# Patient Record
Sex: FEMALE | Race: WHITE | NOT HISPANIC OR LATINO | Employment: OTHER | URBAN - METROPOLITAN AREA
[De-identification: names, ages, dates, MRNs, and addresses within clinical notes are randomized per-mention and may not be internally consistent; named-entity substitution may affect disease eponyms.]

---

## 2017-05-16 ENCOUNTER — RECORDS - HEALTHEAST (OUTPATIENT)
Dept: LAB | Facility: CLINIC | Age: 72
End: 2017-05-16

## 2017-05-16 LAB
CHOLEST SERPL-MCNC: 155 MG/DL
FASTING STATUS PATIENT QL REPORTED: YES
HDLC SERPL-MCNC: 42 MG/DL
LDLC SERPL CALC-MCNC: 77 MG/DL
TRIGL SERPL-MCNC: 178 MG/DL

## 2017-11-21 ENCOUNTER — RECORDS - HEALTHEAST (OUTPATIENT)
Dept: LAB | Facility: CLINIC | Age: 72
End: 2017-11-21

## 2017-11-21 LAB
CHOLEST SERPL-MCNC: 153 MG/DL
FASTING STATUS PATIENT QL REPORTED: ABNORMAL
HDLC SERPL-MCNC: 40 MG/DL
LDLC SERPL CALC-MCNC: 82 MG/DL
TRIGL SERPL-MCNC: 156 MG/DL

## 2018-05-24 ENCOUNTER — RECORDS - HEALTHEAST (OUTPATIENT)
Dept: LAB | Facility: CLINIC | Age: 73
End: 2018-05-24

## 2018-05-24 LAB
ALBUMIN SERPL-MCNC: 4.3 G/DL (ref 3.5–5)
ALP SERPL-CCNC: 54 U/L (ref 45–120)
ALT SERPL W P-5'-P-CCNC: 16 U/L (ref 0–45)
ANION GAP SERPL CALCULATED.3IONS-SCNC: 14 MMOL/L (ref 5–18)
AST SERPL W P-5'-P-CCNC: 26 U/L (ref 0–40)
BASOPHILS # BLD AUTO: 0.1 THOU/UL (ref 0–0.2)
BASOPHILS NFR BLD AUTO: 1 % (ref 0–2)
BILIRUB SERPL-MCNC: 0.8 MG/DL (ref 0–1)
BUN SERPL-MCNC: 14 MG/DL (ref 8–28)
CALCIUM SERPL-MCNC: 9.8 MG/DL (ref 8.5–10.5)
CHLORIDE BLD-SCNC: 109 MMOL/L (ref 98–107)
CHOLEST SERPL-MCNC: 155 MG/DL
CO2 SERPL-SCNC: 20 MMOL/L (ref 22–31)
CREAT SERPL-MCNC: 0.94 MG/DL (ref 0.6–1.1)
EOSINOPHIL # BLD AUTO: 0.3 THOU/UL (ref 0–0.4)
EOSINOPHIL NFR BLD AUTO: 3 % (ref 0–6)
ERYTHROCYTE [DISTWIDTH] IN BLOOD BY AUTOMATED COUNT: 13.2 % (ref 11–14.5)
FASTING STATUS PATIENT QL REPORTED: ABNORMAL
GFR SERPL CREATININE-BSD FRML MDRD: 59 ML/MIN/1.73M2
GLUCOSE BLD-MCNC: 91 MG/DL (ref 70–125)
HCT VFR BLD AUTO: 43.7 % (ref 35–47)
HDLC SERPL-MCNC: 41 MG/DL
HGB BLD-MCNC: 14.2 G/DL (ref 12–16)
LDLC SERPL CALC-MCNC: 79 MG/DL
LYMPHOCYTES # BLD AUTO: 2.8 THOU/UL (ref 0.8–4.4)
LYMPHOCYTES NFR BLD AUTO: 31 % (ref 20–40)
MAGNESIUM SERPL-MCNC: 2.3 MG/DL (ref 1.8–2.6)
MCH RBC QN AUTO: 29 PG (ref 27–34)
MCHC RBC AUTO-ENTMCNC: 32.5 G/DL (ref 32–36)
MCV RBC AUTO: 89 FL (ref 80–100)
MONOCYTES # BLD AUTO: 0.7 THOU/UL (ref 0–0.9)
MONOCYTES NFR BLD AUTO: 8 % (ref 2–10)
NEUTROPHILS # BLD AUTO: 5 THOU/UL (ref 2–7.7)
NEUTROPHILS NFR BLD AUTO: 57 % (ref 50–70)
PLATELET # BLD AUTO: 311 THOU/UL (ref 140–440)
PMV BLD AUTO: 11.5 FL (ref 8.5–12.5)
POTASSIUM BLD-SCNC: 4.7 MMOL/L (ref 3.5–5)
PROT SERPL-MCNC: 7.4 G/DL (ref 6–8)
RBC # BLD AUTO: 4.89 MILL/UL (ref 3.8–5.4)
SODIUM SERPL-SCNC: 143 MMOL/L (ref 136–145)
TRIGL SERPL-MCNC: 174 MG/DL
TSH SERPL DL<=0.005 MIU/L-ACNC: 2.78 UIU/ML (ref 0.3–5)
VIT B12 SERPL-MCNC: 280 PG/ML (ref 213–816)
WBC: 8.9 THOU/UL (ref 4–11)

## 2018-05-25 LAB — 25(OH)D3 SERPL-MCNC: 90.4 NG/ML (ref 30–80)

## 2018-11-06 ENCOUNTER — RECORDS - HEALTHEAST (OUTPATIENT)
Dept: LAB | Facility: CLINIC | Age: 73
End: 2018-11-06

## 2018-11-06 LAB
ALBUMIN SERPL-MCNC: 4.2 G/DL (ref 3.5–5)
ALP SERPL-CCNC: 51 U/L (ref 45–120)
ALT SERPL W P-5'-P-CCNC: 12 U/L (ref 0–45)
ANION GAP SERPL CALCULATED.3IONS-SCNC: 11 MMOL/L (ref 5–18)
AST SERPL W P-5'-P-CCNC: 23 U/L (ref 0–40)
BASOPHILS # BLD AUTO: 0.1 THOU/UL (ref 0–0.2)
BASOPHILS NFR BLD AUTO: 1 % (ref 0–2)
BILIRUB SERPL-MCNC: 0.7 MG/DL (ref 0–1)
BUN SERPL-MCNC: 14 MG/DL (ref 8–28)
CALCIUM SERPL-MCNC: 10.1 MG/DL (ref 8.5–10.5)
CHLORIDE BLD-SCNC: 107 MMOL/L (ref 98–107)
CHOLEST SERPL-MCNC: 154 MG/DL
CO2 SERPL-SCNC: 26 MMOL/L (ref 22–31)
CREAT SERPL-MCNC: 1.02 MG/DL (ref 0.6–1.1)
EOSINOPHIL # BLD AUTO: 0.2 THOU/UL (ref 0–0.4)
EOSINOPHIL NFR BLD AUTO: 3 % (ref 0–6)
ERYTHROCYTE [DISTWIDTH] IN BLOOD BY AUTOMATED COUNT: 13 % (ref 11–14.5)
FASTING STATUS PATIENT QL REPORTED: ABNORMAL
GFR SERPL CREATININE-BSD FRML MDRD: 53 ML/MIN/1.73M2
GLUCOSE BLD-MCNC: 93 MG/DL (ref 70–125)
HCT VFR BLD AUTO: 44.7 % (ref 35–47)
HDLC SERPL-MCNC: 47 MG/DL
HGB BLD-MCNC: 14.4 G/DL (ref 12–16)
LDLC SERPL CALC-MCNC: 78 MG/DL
LYMPHOCYTES # BLD AUTO: 2.5 THOU/UL (ref 0.8–4.4)
LYMPHOCYTES NFR BLD AUTO: 29 % (ref 20–40)
MCH RBC QN AUTO: 29.2 PG (ref 27–34)
MCHC RBC AUTO-ENTMCNC: 32.2 G/DL (ref 32–36)
MCV RBC AUTO: 91 FL (ref 80–100)
MONOCYTES # BLD AUTO: 0.7 THOU/UL (ref 0–0.9)
MONOCYTES NFR BLD AUTO: 8 % (ref 2–10)
NEUTROPHILS # BLD AUTO: 5.2 THOU/UL (ref 2–7.7)
NEUTROPHILS NFR BLD AUTO: 60 % (ref 50–70)
PLATELET # BLD AUTO: 287 THOU/UL (ref 140–440)
PMV BLD AUTO: 12.3 FL (ref 8.5–12.5)
POTASSIUM BLD-SCNC: 4.9 MMOL/L (ref 3.5–5)
PROT SERPL-MCNC: 7.4 G/DL (ref 6–8)
RBC # BLD AUTO: 4.93 MILL/UL (ref 3.8–5.4)
SODIUM SERPL-SCNC: 144 MMOL/L (ref 136–145)
TRIGL SERPL-MCNC: 144 MG/DL
VIT B12 SERPL-MCNC: 250 PG/ML (ref 213–816)
WBC: 8.7 THOU/UL (ref 4–11)

## 2018-11-07 LAB — 25(OH)D3 SERPL-MCNC: 52.2 NG/ML (ref 30–80)

## 2019-05-30 ENCOUNTER — RECORDS - HEALTHEAST (OUTPATIENT)
Dept: LAB | Facility: CLINIC | Age: 74
End: 2019-05-30

## 2019-05-30 LAB
ALBUMIN SERPL-MCNC: 4.3 G/DL (ref 3.5–5)
ALP SERPL-CCNC: 50 U/L (ref 45–120)
ALT SERPL W P-5'-P-CCNC: 14 U/L (ref 0–45)
ANION GAP SERPL CALCULATED.3IONS-SCNC: 15 MMOL/L (ref 5–18)
AST SERPL W P-5'-P-CCNC: 22 U/L (ref 0–40)
BASOPHILS # BLD AUTO: 0.1 THOU/UL (ref 0–0.2)
BASOPHILS NFR BLD AUTO: 1 % (ref 0–2)
BILIRUB SERPL-MCNC: 0.7 MG/DL (ref 0–1)
BUN SERPL-MCNC: 17 MG/DL (ref 8–28)
CALCIUM SERPL-MCNC: 10.2 MG/DL (ref 8.5–10.5)
CHLORIDE BLD-SCNC: 104 MMOL/L (ref 98–107)
CHOLEST SERPL-MCNC: 173 MG/DL
CO2 SERPL-SCNC: 23 MMOL/L (ref 22–31)
CREAT SERPL-MCNC: 1 MG/DL (ref 0.6–1.1)
EOSINOPHIL # BLD AUTO: 0.2 THOU/UL (ref 0–0.4)
EOSINOPHIL NFR BLD AUTO: 2 % (ref 0–6)
ERYTHROCYTE [DISTWIDTH] IN BLOOD BY AUTOMATED COUNT: 14.1 % (ref 11–14.5)
FASTING STATUS PATIENT QL REPORTED: ABNORMAL
GFR SERPL CREATININE-BSD FRML MDRD: 55 ML/MIN/1.73M2
GLUCOSE BLD-MCNC: 81 MG/DL (ref 70–125)
HCT VFR BLD AUTO: 43.9 % (ref 35–47)
HDLC SERPL-MCNC: 47 MG/DL
HGB BLD-MCNC: 14.1 G/DL (ref 12–16)
LDLC SERPL CALC-MCNC: 92 MG/DL
LYMPHOCYTES # BLD AUTO: 2.6 THOU/UL (ref 0.8–4.4)
LYMPHOCYTES NFR BLD AUTO: 28 % (ref 20–40)
MCH RBC QN AUTO: 28.4 PG (ref 27–34)
MCHC RBC AUTO-ENTMCNC: 32.1 G/DL (ref 32–36)
MCV RBC AUTO: 89 FL (ref 80–100)
MONOCYTES # BLD AUTO: 0.8 THOU/UL (ref 0–0.9)
MONOCYTES NFR BLD AUTO: 9 % (ref 2–10)
NEUTROPHILS # BLD AUTO: 5.5 THOU/UL (ref 2–7.7)
NEUTROPHILS NFR BLD AUTO: 60 % (ref 50–70)
PLATELET # BLD AUTO: 297 THOU/UL (ref 140–440)
PMV BLD AUTO: 11.9 FL (ref 8.5–12.5)
POTASSIUM BLD-SCNC: 4.7 MMOL/L (ref 3.5–5)
PROT SERPL-MCNC: 7.4 G/DL (ref 6–8)
RBC # BLD AUTO: 4.96 MILL/UL (ref 3.8–5.4)
SODIUM SERPL-SCNC: 142 MMOL/L (ref 136–145)
TRIGL SERPL-MCNC: 170 MG/DL
TSH SERPL DL<=0.005 MIU/L-ACNC: 2.13 UIU/ML (ref 0.3–5)
VIT B12 SERPL-MCNC: 249 PG/ML (ref 213–816)
WBC: 9.3 THOU/UL (ref 4–11)

## 2019-05-31 LAB — 25(OH)D3 SERPL-MCNC: 43.5 NG/ML (ref 30–80)

## 2019-11-25 ENCOUNTER — RECORDS - HEALTHEAST (OUTPATIENT)
Dept: LAB | Facility: CLINIC | Age: 74
End: 2019-11-25

## 2019-11-25 LAB
ALBUMIN SERPL-MCNC: 4.2 G/DL (ref 3.5–5)
ALP SERPL-CCNC: 47 U/L (ref 45–120)
ALT SERPL W P-5'-P-CCNC: 12 U/L (ref 0–45)
ANION GAP SERPL CALCULATED.3IONS-SCNC: 9 MMOL/L (ref 5–18)
AST SERPL W P-5'-P-CCNC: 20 U/L (ref 0–40)
BILIRUB SERPL-MCNC: 0.8 MG/DL (ref 0–1)
BUN SERPL-MCNC: 15 MG/DL (ref 8–28)
CALCIUM SERPL-MCNC: 9.6 MG/DL (ref 8.5–10.5)
CHLORIDE BLD-SCNC: 108 MMOL/L (ref 98–107)
CHOLEST SERPL-MCNC: 161 MG/DL
CO2 SERPL-SCNC: 27 MMOL/L (ref 22–31)
CREAT SERPL-MCNC: 1.04 MG/DL (ref 0.6–1.1)
FASTING STATUS PATIENT QL REPORTED: ABNORMAL
GFR SERPL CREATININE-BSD FRML MDRD: 52 ML/MIN/1.73M2
GLUCOSE BLD-MCNC: 89 MG/DL (ref 70–125)
HDLC SERPL-MCNC: 47 MG/DL
LDLC SERPL CALC-MCNC: 87 MG/DL
MAGNESIUM SERPL-MCNC: 2 MG/DL (ref 1.8–2.6)
POTASSIUM BLD-SCNC: 4.7 MMOL/L (ref 3.5–5)
PROT SERPL-MCNC: 7.1 G/DL (ref 6–8)
SODIUM SERPL-SCNC: 144 MMOL/L (ref 136–145)
TRIGL SERPL-MCNC: 136 MG/DL
TSH SERPL DL<=0.005 MIU/L-ACNC: 2.15 UIU/ML (ref 0.3–5)
VIT B12 SERPL-MCNC: 240 PG/ML (ref 213–816)

## 2019-11-26 LAB — 25(OH)D3 SERPL-MCNC: 46.2 NG/ML (ref 30–80)

## 2020-05-04 ENCOUNTER — RECORDS - HEALTHEAST (OUTPATIENT)
Dept: LAB | Facility: CLINIC | Age: 75
End: 2020-05-04

## 2020-05-04 LAB
ALBUMIN SERPL-MCNC: 4.2 G/DL (ref 3.5–5)
ALP SERPL-CCNC: 50 U/L (ref 45–120)
ALT SERPL W P-5'-P-CCNC: 15 U/L (ref 0–45)
ANION GAP SERPL CALCULATED.3IONS-SCNC: 11 MMOL/L (ref 5–18)
AST SERPL W P-5'-P-CCNC: 24 U/L (ref 0–40)
BILIRUB SERPL-MCNC: 0.6 MG/DL (ref 0–1)
BUN SERPL-MCNC: 24 MG/DL (ref 8–28)
CALCIUM SERPL-MCNC: 9.6 MG/DL (ref 8.5–10.5)
CHLORIDE BLD-SCNC: 105 MMOL/L (ref 98–107)
CHOLEST SERPL-MCNC: 157 MG/DL
CO2 SERPL-SCNC: 27 MMOL/L (ref 22–31)
CREAT SERPL-MCNC: 1.23 MG/DL (ref 0.6–1.1)
FASTING STATUS PATIENT QL REPORTED: ABNORMAL
GFR SERPL CREATININE-BSD FRML MDRD: 43 ML/MIN/1.73M2
GLUCOSE BLD-MCNC: 92 MG/DL (ref 70–125)
HDLC SERPL-MCNC: 49 MG/DL
LDLC SERPL CALC-MCNC: 80 MG/DL
POTASSIUM BLD-SCNC: 5 MMOL/L (ref 3.5–5)
PROT SERPL-MCNC: 7.3 G/DL (ref 6–8)
SODIUM SERPL-SCNC: 143 MMOL/L (ref 136–145)
TRIGL SERPL-MCNC: 139 MG/DL
VIT B12 SERPL-MCNC: 306 PG/ML (ref 213–816)

## 2020-05-05 LAB — 25(OH)D3 SERPL-MCNC: 45.9 NG/ML (ref 30–80)

## 2020-11-10 ENCOUNTER — RECORDS - HEALTHEAST (OUTPATIENT)
Dept: LAB | Facility: CLINIC | Age: 75
End: 2020-11-10

## 2020-11-10 LAB
ALBUMIN SERPL-MCNC: 4.4 G/DL (ref 3.5–5)
ALP SERPL-CCNC: 50 U/L (ref 45–120)
ALT SERPL W P-5'-P-CCNC: 13 U/L (ref 0–45)
ANION GAP SERPL CALCULATED.3IONS-SCNC: 11 MMOL/L (ref 5–18)
AST SERPL W P-5'-P-CCNC: 23 U/L (ref 0–40)
BILIRUB SERPL-MCNC: 0.8 MG/DL (ref 0–1)
BUN SERPL-MCNC: 15 MG/DL (ref 8–28)
CALCIUM SERPL-MCNC: 9.7 MG/DL (ref 8.5–10.5)
CHLORIDE BLD-SCNC: 107 MMOL/L (ref 98–107)
CHOLEST SERPL-MCNC: 158 MG/DL
CO2 SERPL-SCNC: 25 MMOL/L (ref 22–31)
CREAT SERPL-MCNC: 1.03 MG/DL (ref 0.6–1.1)
FASTING STATUS PATIENT QL REPORTED: YES
GFR SERPL CREATININE-BSD FRML MDRD: 52 ML/MIN/1.73M2
GLUCOSE BLD-MCNC: 87 MG/DL (ref 70–125)
HDLC SERPL-MCNC: 56 MG/DL
LDLC SERPL CALC-MCNC: 79 MG/DL
MAGNESIUM SERPL-MCNC: 2.1 MG/DL (ref 1.8–2.6)
POTASSIUM BLD-SCNC: 4.7 MMOL/L (ref 3.5–5)
PROT SERPL-MCNC: 7.3 G/DL (ref 6–8)
SODIUM SERPL-SCNC: 143 MMOL/L (ref 136–145)
TRIGL SERPL-MCNC: 113 MG/DL
TSH SERPL DL<=0.005 MIU/L-ACNC: 2.53 UIU/ML (ref 0.3–5)
VIT B12 SERPL-MCNC: 290 PG/ML (ref 213–816)

## 2020-11-11 LAB — 25(OH)D3 SERPL-MCNC: 48.1 NG/ML (ref 30–80)

## 2021-05-10 ENCOUNTER — RECORDS - HEALTHEAST (OUTPATIENT)
Dept: LAB | Facility: CLINIC | Age: 76
End: 2021-05-10

## 2021-05-10 LAB
ALBUMIN SERPL-MCNC: 3.9 G/DL (ref 3.5–5)
ALP SERPL-CCNC: 47 U/L (ref 45–120)
ALT SERPL W P-5'-P-CCNC: 13 U/L (ref 0–45)
ANION GAP SERPL CALCULATED.3IONS-SCNC: 12 MMOL/L (ref 5–18)
AST SERPL W P-5'-P-CCNC: 22 U/L (ref 0–40)
BILIRUB SERPL-MCNC: 0.7 MG/DL (ref 0–1)
BUN SERPL-MCNC: 18 MG/DL (ref 8–28)
CALCIUM SERPL-MCNC: 8.9 MG/DL (ref 8.5–10.5)
CHLORIDE BLD-SCNC: 106 MMOL/L (ref 98–107)
CHOLEST SERPL-MCNC: 141 MG/DL
CO2 SERPL-SCNC: 25 MMOL/L (ref 22–31)
CREAT SERPL-MCNC: 1 MG/DL (ref 0.6–1.1)
FASTING STATUS PATIENT QL REPORTED: ABNORMAL
GFR SERPL CREATININE-BSD FRML MDRD: 54 ML/MIN/1.73M2
GLUCOSE BLD-MCNC: 87 MG/DL (ref 70–125)
HDLC SERPL-MCNC: 46 MG/DL
LDLC SERPL CALC-MCNC: 70 MG/DL
POTASSIUM BLD-SCNC: 4.3 MMOL/L (ref 3.5–5)
PROT SERPL-MCNC: 6.6 G/DL (ref 6–8)
SODIUM SERPL-SCNC: 143 MMOL/L (ref 136–145)
TRIGL SERPL-MCNC: 125 MG/DL
TSH SERPL DL<=0.005 MIU/L-ACNC: 3.4 UIU/ML (ref 0.3–5)
VIT B12 SERPL-MCNC: 271 PG/ML (ref 213–816)

## 2021-05-11 LAB — 25(OH)D3 SERPL-MCNC: 40.5 NG/ML (ref 30–80)

## 2021-05-25 ENCOUNTER — RECORDS - HEALTHEAST (OUTPATIENT)
Dept: ADMINISTRATIVE | Facility: CLINIC | Age: 76
End: 2021-05-25

## 2021-05-27 ENCOUNTER — RECORDS - HEALTHEAST (OUTPATIENT)
Dept: ADMINISTRATIVE | Facility: CLINIC | Age: 76
End: 2021-05-27

## 2021-05-28 ENCOUNTER — RECORDS - HEALTHEAST (OUTPATIENT)
Dept: ADMINISTRATIVE | Facility: CLINIC | Age: 76
End: 2021-05-28

## 2021-05-29 ENCOUNTER — RECORDS - HEALTHEAST (OUTPATIENT)
Dept: ADMINISTRATIVE | Facility: CLINIC | Age: 76
End: 2021-05-29

## 2021-05-30 ENCOUNTER — RECORDS - HEALTHEAST (OUTPATIENT)
Dept: ADMINISTRATIVE | Facility: CLINIC | Age: 76
End: 2021-05-30

## 2021-07-13 ENCOUNTER — RECORDS - HEALTHEAST (OUTPATIENT)
Dept: ADMINISTRATIVE | Facility: CLINIC | Age: 76
End: 2021-07-13

## 2021-07-21 ENCOUNTER — RECORDS - HEALTHEAST (OUTPATIENT)
Dept: ADMINISTRATIVE | Facility: CLINIC | Age: 76
End: 2021-07-21

## 2021-11-16 ENCOUNTER — LAB REQUISITION (OUTPATIENT)
Dept: LAB | Facility: CLINIC | Age: 76
End: 2021-11-16

## 2021-11-16 DIAGNOSIS — E53.8 DEFICIENCY OF OTHER SPECIFIED B GROUP VITAMINS: ICD-10-CM

## 2021-11-16 DIAGNOSIS — E78.2 MIXED HYPERLIPIDEMIA: ICD-10-CM

## 2021-11-16 DIAGNOSIS — E55.9 VITAMIN D DEFICIENCY, UNSPECIFIED: ICD-10-CM

## 2021-11-16 LAB
ALBUMIN SERPL-MCNC: 4 G/DL (ref 3.5–5)
ALP SERPL-CCNC: 51 U/L (ref 45–120)
ALT SERPL W P-5'-P-CCNC: 14 U/L (ref 0–45)
ANION GAP SERPL CALCULATED.3IONS-SCNC: 11 MMOL/L (ref 5–18)
AST SERPL W P-5'-P-CCNC: 24 U/L (ref 0–40)
BILIRUB SERPL-MCNC: 0.8 MG/DL (ref 0–1)
BUN SERPL-MCNC: 17 MG/DL (ref 8–28)
CALCIUM SERPL-MCNC: 9.8 MG/DL (ref 8.5–10.5)
CHLORIDE BLD-SCNC: 107 MMOL/L (ref 98–107)
CHOLEST SERPL-MCNC: 146 MG/DL
CO2 SERPL-SCNC: 25 MMOL/L (ref 22–31)
CREAT SERPL-MCNC: 1.06 MG/DL (ref 0.6–1.1)
GFR SERPL CREATININE-BSD FRML MDRD: 51 ML/MIN/1.73M2
GLUCOSE BLD-MCNC: 99 MG/DL (ref 70–125)
HDLC SERPL-MCNC: 48 MG/DL
LDLC SERPL CALC-MCNC: 73 MG/DL
POTASSIUM BLD-SCNC: 4.8 MMOL/L (ref 3.5–5)
PROT SERPL-MCNC: 6.9 G/DL (ref 6–8)
SODIUM SERPL-SCNC: 143 MMOL/L (ref 136–145)
TRIGL SERPL-MCNC: 126 MG/DL
TSH SERPL DL<=0.005 MIU/L-ACNC: 3.07 UIU/ML (ref 0.3–5)
VIT B12 SERPL-MCNC: 270 PG/ML (ref 213–816)

## 2021-11-16 PROCEDURE — 80061 LIPID PANEL: CPT | Performed by: FAMILY MEDICINE

## 2021-11-16 PROCEDURE — 84443 ASSAY THYROID STIM HORMONE: CPT | Performed by: FAMILY MEDICINE

## 2021-11-16 PROCEDURE — 80053 COMPREHEN METABOLIC PANEL: CPT | Performed by: FAMILY MEDICINE

## 2021-11-16 PROCEDURE — 82607 VITAMIN B-12: CPT | Performed by: FAMILY MEDICINE

## 2021-11-16 PROCEDURE — 82306 VITAMIN D 25 HYDROXY: CPT | Performed by: FAMILY MEDICINE

## 2021-11-17 LAB — DEPRECATED CALCIDIOL+CALCIFEROL SERPL-MC: 49 UG/L (ref 30–80)

## 2022-05-16 ENCOUNTER — LAB REQUISITION (OUTPATIENT)
Dept: LAB | Facility: CLINIC | Age: 77
End: 2022-05-16

## 2022-05-16 DIAGNOSIS — E78.2 MIXED HYPERLIPIDEMIA: ICD-10-CM

## 2022-05-16 DIAGNOSIS — I10 ESSENTIAL (PRIMARY) HYPERTENSION: ICD-10-CM

## 2022-05-16 DIAGNOSIS — E55.9 VITAMIN D DEFICIENCY, UNSPECIFIED: ICD-10-CM

## 2022-05-16 DIAGNOSIS — N18.32 CHRONIC KIDNEY DISEASE, STAGE 3B (H): ICD-10-CM

## 2022-05-16 LAB
ALBUMIN SERPL-MCNC: 3.9 G/DL (ref 3.5–5)
ALP SERPL-CCNC: 47 U/L (ref 45–120)
ALT SERPL W P-5'-P-CCNC: 15 U/L (ref 0–45)
ANION GAP SERPL CALCULATED.3IONS-SCNC: 10 MMOL/L (ref 5–18)
AST SERPL W P-5'-P-CCNC: 33 U/L (ref 0–40)
BILIRUB SERPL-MCNC: 0.7 MG/DL (ref 0–1)
BUN SERPL-MCNC: 20 MG/DL (ref 8–28)
CALCIUM SERPL-MCNC: 9.4 MG/DL (ref 8.5–10.5)
CHLORIDE BLD-SCNC: 108 MMOL/L (ref 98–107)
CHOLEST SERPL-MCNC: 142 MG/DL
CO2 SERPL-SCNC: 26 MMOL/L (ref 22–31)
CREAT SERPL-MCNC: 1.09 MG/DL (ref 0.6–1.1)
GFR SERPL CREATININE-BSD FRML MDRD: 53 ML/MIN/1.73M2
GLUCOSE BLD-MCNC: 92 MG/DL (ref 70–125)
HDLC SERPL-MCNC: 51 MG/DL
LDLC SERPL CALC-MCNC: 66 MG/DL
POTASSIUM BLD-SCNC: 4.5 MMOL/L (ref 3.5–5)
PROT SERPL-MCNC: 6.8 G/DL (ref 6–8)
SODIUM SERPL-SCNC: 144 MMOL/L (ref 136–145)
TRIGL SERPL-MCNC: 127 MG/DL
VIT B12 SERPL-MCNC: 486 PG/ML (ref 213–816)

## 2022-05-16 PROCEDURE — 82306 VITAMIN D 25 HYDROXY: CPT | Performed by: STUDENT IN AN ORGANIZED HEALTH CARE EDUCATION/TRAINING PROGRAM

## 2022-05-16 PROCEDURE — 80061 LIPID PANEL: CPT | Performed by: STUDENT IN AN ORGANIZED HEALTH CARE EDUCATION/TRAINING PROGRAM

## 2022-05-16 PROCEDURE — 82607 VITAMIN B-12: CPT | Performed by: STUDENT IN AN ORGANIZED HEALTH CARE EDUCATION/TRAINING PROGRAM

## 2022-05-16 PROCEDURE — 80053 COMPREHEN METABOLIC PANEL: CPT | Performed by: STUDENT IN AN ORGANIZED HEALTH CARE EDUCATION/TRAINING PROGRAM

## 2022-05-17 LAB — DEPRECATED CALCIDIOL+CALCIFEROL SERPL-MC: 65 UG/L (ref 20–75)

## 2022-11-16 ENCOUNTER — LAB REQUISITION (OUTPATIENT)
Dept: LAB | Facility: CLINIC | Age: 77
End: 2022-11-16

## 2022-11-16 DIAGNOSIS — I12.9 HYPERTENSIVE CHRONIC KIDNEY DISEASE WITH STAGE 1 THROUGH STAGE 4 CHRONIC KIDNEY DISEASE, OR UNSPECIFIED CHRONIC KIDNEY DISEASE: ICD-10-CM

## 2022-11-16 DIAGNOSIS — E55.9 VITAMIN D DEFICIENCY, UNSPECIFIED: ICD-10-CM

## 2022-11-16 DIAGNOSIS — E78.2 MIXED HYPERLIPIDEMIA: ICD-10-CM

## 2022-11-16 DIAGNOSIS — I10 ESSENTIAL (PRIMARY) HYPERTENSION: ICD-10-CM

## 2022-11-16 LAB
ERYTHROCYTE [DISTWIDTH] IN BLOOD BY AUTOMATED COUNT: 13.5 % (ref 10–15)
HCT VFR BLD AUTO: 41.2 % (ref 35–47)
HGB BLD-MCNC: 13.2 G/DL (ref 11.7–15.7)
MCH RBC QN AUTO: 28.6 PG (ref 26.5–33)
MCHC RBC AUTO-ENTMCNC: 32 G/DL (ref 31.5–36.5)
MCV RBC AUTO: 89 FL (ref 78–100)
PLATELET # BLD AUTO: 288 10E3/UL (ref 150–450)
RBC # BLD AUTO: 4.61 10E6/UL (ref 3.8–5.2)
WBC # BLD AUTO: 8.9 10E3/UL (ref 4–11)

## 2022-11-16 PROCEDURE — 82607 VITAMIN B-12: CPT | Performed by: STUDENT IN AN ORGANIZED HEALTH CARE EDUCATION/TRAINING PROGRAM

## 2022-11-16 PROCEDURE — 80061 LIPID PANEL: CPT | Performed by: STUDENT IN AN ORGANIZED HEALTH CARE EDUCATION/TRAINING PROGRAM

## 2022-11-16 PROCEDURE — 80053 COMPREHEN METABOLIC PANEL: CPT | Performed by: STUDENT IN AN ORGANIZED HEALTH CARE EDUCATION/TRAINING PROGRAM

## 2022-11-16 PROCEDURE — 85027 COMPLETE CBC AUTOMATED: CPT | Performed by: STUDENT IN AN ORGANIZED HEALTH CARE EDUCATION/TRAINING PROGRAM

## 2022-11-16 PROCEDURE — 82306 VITAMIN D 25 HYDROXY: CPT | Performed by: STUDENT IN AN ORGANIZED HEALTH CARE EDUCATION/TRAINING PROGRAM

## 2022-11-17 LAB
ALBUMIN SERPL BCG-MCNC: 4.3 G/DL (ref 3.5–5.2)
ALP SERPL-CCNC: 52 U/L (ref 35–104)
ALT SERPL W P-5'-P-CCNC: 12 U/L (ref 10–35)
ANION GAP SERPL CALCULATED.3IONS-SCNC: 13 MMOL/L (ref 7–15)
AST SERPL W P-5'-P-CCNC: 25 U/L (ref 10–35)
BILIRUB SERPL-MCNC: 0.7 MG/DL
BUN SERPL-MCNC: 17.2 MG/DL (ref 8–23)
CALCIUM SERPL-MCNC: 9.4 MG/DL (ref 8.8–10.2)
CHLORIDE SERPL-SCNC: 105 MMOL/L (ref 98–107)
CHOLEST SERPL-MCNC: 143 MG/DL
CREAT SERPL-MCNC: 1.11 MG/DL (ref 0.51–0.95)
DEPRECATED CALCIDIOL+CALCIFEROL SERPL-MC: 75 UG/L (ref 20–75)
DEPRECATED HCO3 PLAS-SCNC: 23 MMOL/L (ref 22–29)
GFR SERPL CREATININE-BSD FRML MDRD: 51 ML/MIN/1.73M2
GLUCOSE SERPL-MCNC: 94 MG/DL (ref 70–99)
HDLC SERPL-MCNC: 52 MG/DL
LDLC SERPL CALC-MCNC: 73 MG/DL
NONHDLC SERPL-MCNC: 91 MG/DL
POTASSIUM SERPL-SCNC: 4.5 MMOL/L (ref 3.4–5.3)
PROT SERPL-MCNC: 6.7 G/DL (ref 6.4–8.3)
SODIUM SERPL-SCNC: 141 MMOL/L (ref 136–145)
TRIGL SERPL-MCNC: 91 MG/DL
VIT B12 SERPL-MCNC: 721 PG/ML (ref 232–1245)

## 2023-05-24 ENCOUNTER — LAB REQUISITION (OUTPATIENT)
Dept: LAB | Facility: CLINIC | Age: 78
End: 2023-05-24

## 2023-05-24 DIAGNOSIS — I12.9 HYPERTENSIVE CHRONIC KIDNEY DISEASE WITH STAGE 1 THROUGH STAGE 4 CHRONIC KIDNEY DISEASE, OR UNSPECIFIED CHRONIC KIDNEY DISEASE: ICD-10-CM

## 2023-05-24 DIAGNOSIS — N18.31 CHRONIC KIDNEY DISEASE, STAGE 3A (H): ICD-10-CM

## 2023-05-24 DIAGNOSIS — E53.8 DEFICIENCY OF OTHER SPECIFIED B GROUP VITAMINS: ICD-10-CM

## 2023-05-24 DIAGNOSIS — E78.2 MIXED HYPERLIPIDEMIA: ICD-10-CM

## 2023-05-24 LAB
ALBUMIN SERPL BCG-MCNC: 4.3 G/DL (ref 3.5–5.2)
ALP SERPL-CCNC: 45 U/L (ref 35–104)
ALT SERPL W P-5'-P-CCNC: 14 U/L (ref 10–35)
ANION GAP SERPL CALCULATED.3IONS-SCNC: 12 MMOL/L (ref 7–15)
AST SERPL W P-5'-P-CCNC: 26 U/L (ref 10–35)
BILIRUB SERPL-MCNC: 0.7 MG/DL
BUN SERPL-MCNC: 19.5 MG/DL (ref 8–23)
CALCIUM SERPL-MCNC: 9.4 MG/DL (ref 8.8–10.2)
CHLORIDE SERPL-SCNC: 106 MMOL/L (ref 98–107)
CHOLEST SERPL-MCNC: 149 MG/DL
CREAT SERPL-MCNC: 1.23 MG/DL (ref 0.51–0.95)
DEPRECATED CALCIDIOL+CALCIFEROL SERPL-MC: 77 UG/L (ref 20–75)
DEPRECATED HCO3 PLAS-SCNC: 24 MMOL/L (ref 22–29)
GFR SERPL CREATININE-BSD FRML MDRD: 45 ML/MIN/1.73M2
GLUCOSE SERPL-MCNC: 96 MG/DL (ref 70–99)
HDLC SERPL-MCNC: 56 MG/DL
LDLC SERPL CALC-MCNC: 72 MG/DL
NONHDLC SERPL-MCNC: 93 MG/DL
POTASSIUM SERPL-SCNC: 5.1 MMOL/L (ref 3.4–5.3)
PROT SERPL-MCNC: 6.3 G/DL (ref 6.4–8.3)
SODIUM SERPL-SCNC: 142 MMOL/L (ref 136–145)
TRIGL SERPL-MCNC: 106 MG/DL
VIT B12 SERPL-MCNC: 582 PG/ML (ref 232–1245)

## 2023-05-24 PROCEDURE — 82306 VITAMIN D 25 HYDROXY: CPT | Performed by: STUDENT IN AN ORGANIZED HEALTH CARE EDUCATION/TRAINING PROGRAM

## 2023-05-24 PROCEDURE — 80061 LIPID PANEL: CPT | Performed by: STUDENT IN AN ORGANIZED HEALTH CARE EDUCATION/TRAINING PROGRAM

## 2023-05-24 PROCEDURE — 80053 COMPREHEN METABOLIC PANEL: CPT | Performed by: STUDENT IN AN ORGANIZED HEALTH CARE EDUCATION/TRAINING PROGRAM

## 2023-05-24 PROCEDURE — 82607 VITAMIN B-12: CPT | Performed by: STUDENT IN AN ORGANIZED HEALTH CARE EDUCATION/TRAINING PROGRAM

## 2023-11-09 ENCOUNTER — LAB REQUISITION (OUTPATIENT)
Dept: LAB | Facility: CLINIC | Age: 78
End: 2023-11-09

## 2023-11-09 DIAGNOSIS — M79.10 MYALGIA, UNSPECIFIED SITE: ICD-10-CM

## 2023-11-09 DIAGNOSIS — E53.8 DEFICIENCY OF OTHER SPECIFIED B GROUP VITAMINS: ICD-10-CM

## 2023-11-09 PROCEDURE — 86140 C-REACTIVE PROTEIN: CPT | Performed by: STUDENT IN AN ORGANIZED HEALTH CARE EDUCATION/TRAINING PROGRAM

## 2023-11-09 PROCEDURE — 86431 RHEUMATOID FACTOR QUANT: CPT | Performed by: STUDENT IN AN ORGANIZED HEALTH CARE EDUCATION/TRAINING PROGRAM

## 2023-11-09 PROCEDURE — 80053 COMPREHEN METABOLIC PANEL: CPT | Performed by: STUDENT IN AN ORGANIZED HEALTH CARE EDUCATION/TRAINING PROGRAM

## 2023-11-09 PROCEDURE — 82607 VITAMIN B-12: CPT | Performed by: STUDENT IN AN ORGANIZED HEALTH CARE EDUCATION/TRAINING PROGRAM

## 2023-11-10 LAB
ALBUMIN SERPL BCG-MCNC: 4 G/DL (ref 3.5–5.2)
ALP SERPL-CCNC: 56 U/L (ref 35–104)
ALT SERPL W P-5'-P-CCNC: 10 U/L (ref 0–50)
ANION GAP SERPL CALCULATED.3IONS-SCNC: 12 MMOL/L (ref 7–15)
AST SERPL W P-5'-P-CCNC: 23 U/L (ref 0–45)
BILIRUB SERPL-MCNC: 0.3 MG/DL
BUN SERPL-MCNC: 18.8 MG/DL (ref 8–23)
CALCIUM SERPL-MCNC: 8.9 MG/DL (ref 8.8–10.2)
CHLORIDE SERPL-SCNC: 103 MMOL/L (ref 98–107)
CREAT SERPL-MCNC: 1.17 MG/DL (ref 0.51–0.95)
CRP SERPL-MCNC: 8.35 MG/L
DEPRECATED HCO3 PLAS-SCNC: 24 MMOL/L (ref 22–29)
EGFRCR SERPLBLD CKD-EPI 2021: 48 ML/MIN/1.73M2
GLUCOSE SERPL-MCNC: 87 MG/DL (ref 70–99)
POTASSIUM SERPL-SCNC: 4.6 MMOL/L (ref 3.4–5.3)
PROT SERPL-MCNC: 6.9 G/DL (ref 6.4–8.3)
RHEUMATOID FACT SER NEPH-ACNC: 11 IU/ML
SODIUM SERPL-SCNC: 139 MMOL/L (ref 135–145)
VIT B12 SERPL-MCNC: 583 PG/ML (ref 232–1245)

## 2023-11-17 ENCOUNTER — HOSPITAL ENCOUNTER (EMERGENCY)
Facility: CLINIC | Age: 78
Discharge: HOME | End: 2023-11-17
Attending: STUDENT IN AN ORGANIZED HEALTH CARE EDUCATION/TRAINING PROGRAM | Admitting: STUDENT IN AN ORGANIZED HEALTH CARE EDUCATION/TRAINING PROGRAM
Payer: COMMERCIAL

## 2023-11-17 VITALS
HEART RATE: 68 BPM | TEMPERATURE: 97.6 F | OXYGEN SATURATION: 96 % | BODY MASS INDEX: 25.87 KG/M2 | DIASTOLIC BLOOD PRESSURE: 81 MMHG | HEIGHT: 63 IN | RESPIRATION RATE: 18 BRPM | WEIGHT: 146 LBS | SYSTOLIC BLOOD PRESSURE: 111 MMHG

## 2023-11-17 DIAGNOSIS — M25.50 MULTIPLE JOINT PAIN: ICD-10-CM

## 2023-11-17 LAB
ALBUMIN SERPL BCG-MCNC: 3.8 G/DL (ref 3.5–5.2)
ALP SERPL-CCNC: 56 U/L (ref 40–150)
ALT SERPL W P-5'-P-CCNC: 6 U/L (ref 0–50)
ANION GAP SERPL CALCULATED.3IONS-SCNC: 13 MMOL/L (ref 7–15)
AST SERPL W P-5'-P-CCNC: 28 U/L (ref 0–45)
BASOPHILS # BLD AUTO: 0 10E3/UL (ref 0–0.2)
BASOPHILS NFR BLD AUTO: 0 %
BILIRUB SERPL-MCNC: 0.4 MG/DL
BUN SERPL-MCNC: 24.5 MG/DL (ref 8–23)
CALCIUM SERPL-MCNC: 9.6 MG/DL (ref 8.8–10.2)
CHLORIDE SERPL-SCNC: 104 MMOL/L (ref 98–107)
CK SERPL-CCNC: 57 U/L (ref 26–192)
CREAT SERPL-MCNC: 1.03 MG/DL (ref 0.51–0.95)
CRP SERPL-MCNC: 53.5 MG/L
DEPRECATED HCO3 PLAS-SCNC: 25 MMOL/L (ref 22–29)
EGFRCR SERPLBLD CKD-EPI 2021: 56 ML/MIN/1.73M2
EOSINOPHIL # BLD AUTO: 0.1 10E3/UL (ref 0–0.7)
EOSINOPHIL NFR BLD AUTO: 2 %
ERYTHROCYTE [DISTWIDTH] IN BLOOD BY AUTOMATED COUNT: 13.9 % (ref 10–15)
ERYTHROCYTE [SEDIMENTATION RATE] IN BLOOD BY WESTERGREN METHOD: 30 MM/HR (ref 0–30)
GLUCOSE SERPL-MCNC: 95 MG/DL (ref 70–99)
HCT VFR BLD AUTO: 40.9 % (ref 35–47)
HGB BLD-MCNC: 13.2 G/DL (ref 11.7–15.7)
IMM GRANULOCYTES # BLD: 0 10E3/UL
IMM GRANULOCYTES NFR BLD: 0 %
LYMPHOCYTES # BLD AUTO: 1.3 10E3/UL (ref 0.8–5.3)
LYMPHOCYTES NFR BLD AUTO: 16 %
MCH RBC QN AUTO: 28.7 PG (ref 26.5–33)
MCHC RBC AUTO-ENTMCNC: 32.3 G/DL (ref 31.5–36.5)
MCV RBC AUTO: 89 FL (ref 78–100)
MONOCYTES # BLD AUTO: 0.6 10E3/UL (ref 0–1.3)
MONOCYTES NFR BLD AUTO: 7 %
NEUTROPHILS # BLD AUTO: 6.1 10E3/UL (ref 1.6–8.3)
NEUTROPHILS NFR BLD AUTO: 75 %
NRBC # BLD AUTO: 0 10E3/UL
NRBC BLD AUTO-RTO: 0 /100
PLATELET # BLD AUTO: 282 10E3/UL (ref 150–450)
POTASSIUM SERPL-SCNC: 4.4 MMOL/L (ref 3.4–5.3)
PROT SERPL-MCNC: 7 G/DL (ref 6.4–8.3)
RBC # BLD AUTO: 4.6 10E6/UL (ref 3.8–5.2)
RHEUMATOID FACT SERPL-ACNC: <10 IU/ML
SODIUM SERPL-SCNC: 142 MMOL/L (ref 135–145)
WBC # BLD AUTO: 8.2 10E3/UL (ref 4–11)

## 2023-11-17 PROCEDURE — 80053 COMPREHEN METABOLIC PANEL: CPT | Performed by: STUDENT IN AN ORGANIZED HEALTH CARE EDUCATION/TRAINING PROGRAM

## 2023-11-17 PROCEDURE — 36415 COLL VENOUS BLD VENIPUNCTURE: CPT | Performed by: STUDENT IN AN ORGANIZED HEALTH CARE EDUCATION/TRAINING PROGRAM

## 2023-11-17 PROCEDURE — 85025 COMPLETE CBC W/AUTO DIFF WBC: CPT | Performed by: STUDENT IN AN ORGANIZED HEALTH CARE EDUCATION/TRAINING PROGRAM

## 2023-11-17 PROCEDURE — 86140 C-REACTIVE PROTEIN: CPT | Performed by: STUDENT IN AN ORGANIZED HEALTH CARE EDUCATION/TRAINING PROGRAM

## 2023-11-17 PROCEDURE — 86431 RHEUMATOID FACTOR QUANT: CPT | Performed by: STUDENT IN AN ORGANIZED HEALTH CARE EDUCATION/TRAINING PROGRAM

## 2023-11-17 PROCEDURE — 99283 EMERGENCY DEPT VISIT LOW MDM: CPT

## 2023-11-17 PROCEDURE — 85652 RBC SED RATE AUTOMATED: CPT | Performed by: STUDENT IN AN ORGANIZED HEALTH CARE EDUCATION/TRAINING PROGRAM

## 2023-11-17 PROCEDURE — 82550 ASSAY OF CK (CPK): CPT | Performed by: STUDENT IN AN ORGANIZED HEALTH CARE EDUCATION/TRAINING PROGRAM

## 2023-11-17 PROCEDURE — 250N000012 HC RX MED GY IP 250 OP 636 PS 637: Performed by: STUDENT IN AN ORGANIZED HEALTH CARE EDUCATION/TRAINING PROGRAM

## 2023-11-17 PROCEDURE — 86200 CCP ANTIBODY: CPT | Performed by: STUDENT IN AN ORGANIZED HEALTH CARE EDUCATION/TRAINING PROGRAM

## 2023-11-17 RX ORDER — PREDNISONE 20 MG/1
60 TABLET ORAL DAILY
Qty: 15 TABLET | Refills: 0 | Status: SHIPPED | OUTPATIENT
Start: 2023-11-17 | End: 2023-11-22

## 2023-11-17 RX ORDER — HYDROCODONE BITARTRATE AND ACETAMINOPHEN 5; 325 MG/1; MG/1
1 TABLET ORAL EVERY 6 HOURS PRN
Qty: 6 TABLET | Refills: 0 | Status: SHIPPED | OUTPATIENT
Start: 2023-11-17 | End: 2023-11-20

## 2023-11-17 RX ORDER — PREDNISONE 20 MG/1
60 TABLET ORAL ONCE
Status: COMPLETED | OUTPATIENT
Start: 2023-11-17 | End: 2023-11-17

## 2023-11-17 RX ADMIN — PREDNISONE 60 MG: 20 TABLET ORAL at 14:38

## 2023-11-17 NOTE — DISCHARGE INSTRUCTIONS
Your blood work today so far was reassuring.  Please take the steroids as prescribed.  Please take the hydrocodone as needed.    Norco (hydrocodone + tylenol) is a narcotic pain medication that may make you feel drousy. Please do not drive a car, operate machinery or any other activity that you should not be sedated.  It may also cause constipation, please take an over the counter stool softner with this medication.    I also placed an electronic referral for rheumatology.  They should be calling you back in the next few days/early next week.

## 2023-11-17 NOTE — ED TRIAGE NOTES
Pt presents to the ED with c/o worsening joint pain over the past month. Pt taking gabapentin and tylenol without relief. Pt states hands, fingers, shoulders, and knee have been hurting worse. Having difficult time hold items, walking, and feeding herself.

## 2023-11-17 NOTE — ED PROVIDER NOTES
Emergency Department Encounter         FINAL IMPRESSION:  Polyarthralgia          ED COURSE AND MEDICAL DECISION MAKING       ED Course as of 11/17/23 1602   Fri Nov 17, 2023   1423 Patient is a 78-year-old female here with multiple areas of joint pain for the last 16 days or so.  States symptoms began beginning the month.  States her shoulders, elbows, hands, wrists, knees, hips are sore and she is generally weak and tired.  Having difficulty standing up out of a chair.  Difficulty brushing her hair.  No systemic symptoms including fevers, chills, nausea vomiting.  No chest pain or trouble breathing.  No headaches, vision changes or paresthesias.  Normal urination normal bowel movements.  No rashes appreciated specifically on the backs of her hands or across her shoulders suggesting shawl sign.  On examination she does have minimal edema bilateral hands symmetrically.  She has normal strength otherwise.  Concern for diagnoses such as RA, PMR, I think she will need basic blood work here and an urgent referral to rheumatology.  We will start her on high-dose steroids to hopefully help with her symptomatology.   -Labs suggestive of inflammatory condition.  Patient given prednisone here.  She does have a history of cortisone allergy in her chart.  Shared decision making with patient and family regarding potential cross-reactivity with prednisone.  Sound like she had a reaction to a cortisone cream at 1 point.  Patient states that she is okay with trying prednisone here with risks of allergic reaction.  -Electronic referral given for rheumatology.      1:56 PM I met with the patient to obtain patient history and performed a physical exam. Discussed plan for ED work up including potential diagnostic studies and interventions.  3:00 PM Reevaluated and updated the patient with findings. We discussed the plan for discharge and the patient is agreeable. Reviewed supportive cares, symptomatic treatment, outpatient follow  up, and reasons to return to the Emergency Department. Patient to be discharged by ED RN.         Medical Decision Making    History:  Supplemental history from: Documented in chart, if applicable  External Record(s) reviewed: Documented in chart, if applicable.    Work Up:  Chart documentation includes differential considered and any EKGs or imaging independently interpreted by provider, where specified.  In additional to work up documented, I considered the following work up: Documented in chart, if applicable.    External consultation:  Discussion of management with another provider: Documented in chart, if applicable    Complicating factors:  Care impacted by chronic illness: Chronic Pain  Care affected by social determinants of health: Access to Medical Care    Disposition considerations: Discharge. I prescribed additional prescription strength medication(s) as charted. See documentation for any additional details.                  EKG  None      Critical Care     Performed by: Ludwig Hastings or    Authorized by: Ludwig Hastings  Total critical care time:  minutes  Critical care was necessary to treat or prevent imminent or life-threatening deterioration of the following conditions:   Critical care was time spent personally by me on the following activities: development of treatment plan with patient or surrogate, discussions with consultants, examination of patient, evaluation of patient's response to treatment, obtaining history from patient or surrogate, ordering and performing treatments and interventions, ordering and review of laboratory studies, ordering and review of radiographic studies, re-evaluation of patient's condition and monitoring for potential decompensation.  Critical care time was exclusive of separately billable procedures and treating other patients.'    At the conclusion of the encounter I discussed the results of all the tests and the disposition. The questions were answered. The patient or family  "acknowledged understanding and was agreeable with the care plan.        MEDICATIONS GIVEN IN THE EMERGENCY DEPARTMENT:  Medications   predniSONE (DELTASONE) tablet 60 mg (60 mg Oral $Given 11/17/23 4454)       NEW PRESCRIPTIONS STARTED AT TODAY'S ED VISIT:  Discharge Medication List as of 11/17/2023  3:10 PM        START taking these medications    Details   !! HYDROcodone-acetaminophen (NORCO) 5-325 MG tablet Take 1 tablet by mouth every 6 hours as needed for pain, Disp-6 tablet, R-0, Local Print      predniSONE (DELTASONE) 20 MG tablet Take 3 tablets (60 mg) by mouth daily for 5 days, Disp-15 tablet, R-0, Local Print       !! - Potential duplicate medications found. Please discuss with provider.          HPI     Patient information obtained from: patient    Use of : N/A     Chelsea CHAIREZ Madonna is a 78 year old female with a pertinent history of arthritis who presents to this ED via walk-in with family for evaluation of joint pain.    Patient reports persistent progressively worsening severe generalized joint pain since 11/1/23. She says the pain started in her hands and then progressively spread to her shoulder joints, pack, hips, and knees. She went to see a PCP on 11/9 who prescribed her gabapentin but she says this didn't do anything for her and they \"found nothing to tell her.\" Within the past 3-4 nights, she says the pain severely worsened and now she is unable to sleep, get up, or pick small things up like a fork. She's never felt this before. Due to the persistent pain, she decided to come into the ED for evaluation. She also associates some decrease in appetite secondary to the pain. She has been using tylenol and ibuprofen with minimal relief.     She otherwise denies associating fever, chills, chest pain, shortness of breath, dysuria, difficulty urinating, abdominal pain, decrease in liquid intake, cough, congestion, changes in vision, and rash. There were no other concerns/complaints at this " "time.      Shx: She lives home alone since 6/2023 after her  passed.      MEDICAL HISTORY     History reviewed. No pertinent past medical history.    History reviewed. No pertinent surgical history.         HYDROcodone-acetaminophen (NORCO) 5-325 MG tablet  predniSONE (DELTASONE) 20 MG tablet  aspirin 81 mg chewable tablet  calcium-vitamin D 500 mg(1,250mg) -200 unit per tablet  cholecalciferol, vitamin D3, 1,000 unit tablet  cyanocobalamin 1000 MCG tablet  furosemide (LASIX) 20 MG tablet  glucosamine sulfate (GLUCOSAMINE) 500 mg Tab  HYDROcodone-acetaminophen 5-325 mg per tablet  ibuprofen (ADVIL,MOTRIN) 600 MG tablet  lisinopril (PRINIVIL,ZESTRIL) 40 MG tablet  magnesium 200 mg Tab  ondansetron (ZOFRAN ODT) 4 MG disintegrating tablet  potassium chloride SA (K-DUR,KLOR-CON) 10 MEQ tablet  pravastatin (PRAVACHOL) 80 MG tablet  therapeutic multivitamin (THERAGRAN) tablet            PHYSICAL EXAM     /81   Pulse 68   Temp 97.6  F (36.4  C) (Temporal)   Resp 18   Ht 1.6 m (5' 3\")   Wt 66.2 kg (146 lb)   SpO2 96%   BMI 25.86 kg/m        PHYSICAL EXAM:     General: Patient appears well, nontoxic, comfortable  HEENT: Moist mucous membranes,  No head trauma.    Cardiovascular: Normal rate, normal rhythm. Minimal edema bilateral hands symmetrically. No appreciable murmur.  Respiratory: No signs of respiratory distress, lungs are clear to auscultation bilaterally with no wheezes rhonchi or rales.  Abdominal: Soft, nontender, nondistended, no palpable masses, no guarding, no rebound  Musculoskeletal: Full range of motion of joints, no deformities appreciated.  Bilateral hand swelling.  No redness.  Normal strength in upper and lower extremities.  Neurological: Alert and oriented, grossly neurologically intact. She has normal strength otherwise.   Psychological: Normal affect and mood.  Integument: No rashes appreciated        RESULTS       Labs Ordered and Resulted from Time of ED Arrival to Time of ED " Departure   COMPREHENSIVE METABOLIC PANEL - Abnormal       Result Value    Sodium 142      Potassium 4.4      Carbon Dioxide (CO2) 25      Anion Gap 13      Urea Nitrogen 24.5 (*)     Creatinine 1.03 (*)     GFR Estimate 56 (*)     Calcium 9.6      Chloride 104      Glucose 95      Alkaline Phosphatase 56      AST 28      ALT 6      Protein Total 7.0      Albumin 3.8      Bilirubin Total 0.4     CRP INFLAMMATION - Abnormal    CRP Inflammation 53.50 (*)    CK TOTAL - Normal    CK 57     ERYTHROCYTE SEDIMENTATION RATE AUTO - Normal    Erythrocyte Sedimentation Rate 30     CBC WITH PLATELETS AND DIFFERENTIAL    WBC Count 8.2      RBC Count 4.60      Hemoglobin 13.2      Hematocrit 40.9      MCV 89      MCH 28.7      MCHC 32.3      RDW 13.9      Platelet Count 282      % Neutrophils 75      % Lymphocytes 16      % Monocytes 7      % Eosinophils 2      % Basophils 0      % Immature Granulocytes 0      NRBCs per 100 WBC 0      Absolute Neutrophils 6.1      Absolute Lymphocytes 1.3      Absolute Monocytes 0.6      Absolute Eosinophils 0.1      Absolute Basophils 0.0      Absolute Immature Granulocytes 0.0      Absolute NRBCs 0.0     RHEUMATOID FACTOR   CYCLIC CITRULLINATED PEPTIDE ANTIBODY IGG       No orders to display         PROCEDURES:  Procedures:  Procedures       I, Kelly Verdin am serving as a scribe to document services personally performed by Ludwig Hastings DO, based on my observations and the provider's statements to me.  I, Ludwig Hastings DO, attest that Kelly Verdin is acting in a scribe capacity, has observed my performance of the services and has documented them in accordance with my direction.    Ludwig Hastings DO  Emergency Medicine  [facility] EMERGENCY ROOM       Ludwig Hastings DO  11/17/23 6409

## 2023-11-20 LAB — CCP AB SER IA-ACNC: 1.4 U/ML

## 2023-11-21 ENCOUNTER — ANCILLARY PROCEDURE (OUTPATIENT)
Dept: GENERAL RADIOLOGY | Facility: CLINIC | Age: 78
End: 2023-11-21
Attending: STUDENT IN AN ORGANIZED HEALTH CARE EDUCATION/TRAINING PROGRAM
Payer: COMMERCIAL

## 2023-11-21 ENCOUNTER — OFFICE VISIT (OUTPATIENT)
Dept: RHEUMATOLOGY | Facility: CLINIC | Age: 78
End: 2023-11-21
Attending: STUDENT IN AN ORGANIZED HEALTH CARE EDUCATION/TRAINING PROGRAM
Payer: COMMERCIAL

## 2023-11-21 VITALS
WEIGHT: 150 LBS | HEART RATE: 56 BPM | BODY MASS INDEX: 26.57 KG/M2 | OXYGEN SATURATION: 97 % | SYSTOLIC BLOOD PRESSURE: 98 MMHG | DIASTOLIC BLOOD PRESSURE: 56 MMHG

## 2023-11-21 DIAGNOSIS — M35.3 POLYMYALGIA RHEUMATICA (H): ICD-10-CM

## 2023-11-21 DIAGNOSIS — Z11.59 NEED FOR HEPATITIS C SCREENING TEST: ICD-10-CM

## 2023-11-21 DIAGNOSIS — Z79.52 LONG-TERM CORTICOSTEROID USE: ICD-10-CM

## 2023-11-21 DIAGNOSIS — Z11.59 NEED FOR HEPATITIS B SCREENING TEST: ICD-10-CM

## 2023-11-21 DIAGNOSIS — M25.50 MULTIPLE JOINT PAIN: ICD-10-CM

## 2023-11-21 DIAGNOSIS — M35.3 POLYMYALGIA RHEUMATICA (H): Primary | ICD-10-CM

## 2023-11-21 DIAGNOSIS — Z11.4 ENCOUNTER FOR SCREENING FOR HIV: ICD-10-CM

## 2023-11-21 DIAGNOSIS — Z11.1 SCREENING FOR TUBERCULOSIS: ICD-10-CM

## 2023-11-21 PROCEDURE — 99205 OFFICE O/P NEW HI 60 MIN: CPT | Performed by: STUDENT IN AN ORGANIZED HEALTH CARE EDUCATION/TRAINING PROGRAM

## 2023-11-21 PROCEDURE — 73630 X-RAY EXAM OF FOOT: CPT | Mod: TC | Performed by: RADIOLOGY

## 2023-11-21 PROCEDURE — G0463 HOSPITAL OUTPT CLINIC VISIT: HCPCS | Performed by: STUDENT IN AN ORGANIZED HEALTH CARE EDUCATION/TRAINING PROGRAM

## 2023-11-21 PROCEDURE — 73130 X-RAY EXAM OF HAND: CPT | Mod: TC | Performed by: RADIOLOGY

## 2023-11-21 RX ORDER — EPINEPHRINE 1 MG/ML
0.3 INJECTION, SOLUTION, CONCENTRATE INTRAVENOUS EVERY 5 MIN PRN
OUTPATIENT
Start: 2023-11-21

## 2023-11-21 RX ORDER — ALBUTEROL SULFATE 0.83 MG/ML
2.5 SOLUTION RESPIRATORY (INHALATION)
OUTPATIENT
Start: 2023-11-21

## 2023-11-21 RX ORDER — METHYLPREDNISOLONE SODIUM SUCCINATE 125 MG/2ML
125 INJECTION, POWDER, LYOPHILIZED, FOR SOLUTION INTRAMUSCULAR; INTRAVENOUS
Start: 2023-11-21

## 2023-11-21 RX ORDER — ALBUTEROL SULFATE 90 UG/1
1-2 AEROSOL, METERED RESPIRATORY (INHALATION)
Start: 2023-11-21

## 2023-11-21 RX ORDER — PENTAMIDINE ISETHIONATE 300 MG/300MG
300 INHALANT RESPIRATORY (INHALATION) ONCE
Start: 2023-11-21 | End: 2023-11-21

## 2023-11-21 RX ORDER — ALBUTEROL SULFATE 0.83 MG/ML
2.5 SOLUTION RESPIRATORY (INHALATION) ONCE
Start: 2023-11-21 | End: 2023-11-21

## 2023-11-21 RX ORDER — MEPERIDINE HYDROCHLORIDE 25 MG/ML
25 INJECTION INTRAMUSCULAR; INTRAVENOUS; SUBCUTANEOUS EVERY 30 MIN PRN
OUTPATIENT
Start: 2023-11-21

## 2023-11-21 RX ORDER — PREDNISONE 5 MG/1
TABLET ORAL
Qty: 224 TABLET | Refills: 0 | Status: SHIPPED | OUTPATIENT
Start: 2023-11-21 | End: 2024-01-10

## 2023-11-21 RX ORDER — DIPHENHYDRAMINE HYDROCHLORIDE 50 MG/ML
50 INJECTION INTRAMUSCULAR; INTRAVENOUS
Start: 2023-11-21

## 2023-11-21 ASSESSMENT — PAIN SCALES - GENERAL: PAINLEVEL: MODERATE PAIN (5)

## 2023-11-21 NOTE — PROGRESS NOTES
Assessment & Plan     Chelsea is a very pleasant 78-year-old female with past medical history of hypertension.  She presented to the emergency room with bilateral shoulder and hip pain and morning stiffness as well as pain and morning stiffness in the wrists.  She did not have symptoms suggestive of GCA or systemic symptoms.  She has history of trochanteric bursitis few years ago.  She started on prednisone 60 mg with significant improvement in her symptoms.    She remained with bilateral tenderness over the greater trochanters but otherwise good range of motion in the shoulders and wrists bilaterally.    We discussed that her presentation is consistent with polymyalgia rheumatica.  We discussed the diagnosis at length including the need for prednisone therapy for prolonged duration.  She has symptoms involving her wrists but not much in the MCP PIP distribution, she had negative rheumatoid factor and CCP but nonetheless we discussed getting x-rays of the hands and feet to to complete the work-up.     We discussed side effects of prolonged prednisone therapy as well and we discussed getting baseline screening for chronic infections as well as DEXA scan      PLAN:      Polymyalgia rheumatica (H24)  - predniSONE (DELTASONE) 5 MG tablet; Take 5 tablets (25 mg) by mouth daily for 28 days, THEN 4 tablets (20 mg) daily for 21 days.  - Erythrocyte sedimentation rate auto; Future  - CRP inflammation; Future  - XR Foot Bilateral G/E 3 Views; Future  - XR Hand Bilateral G/E 3 Views; Future    Long-term corticosteroid use  - Inhaled Pentamidine given bactrim allergy   - Dexa hip/pelvis/spine; Future    Need for hepatitis C screening test  - Hepatitis C Screen Reflex to HCV RNA Quant and Genotype; Future    Screening for tuberculosis  - Quantiferon-TB Gold Plus; Future    Need for hepatitis B screening test  - Hepatitis B surface antigen; Future  - Hepatitis B Surface Antibody; Future  - Hepatitis B core antibody;  Future    Encounter for screening for HIV  - HIV Antigen Antibody Combo Cascade; Future       65 minutes spent by me on the date of the encounter doing chart review, history and exam, documentation and further activities per the note      Return in about 7 weeks (around 1/9/2024) for Tuesday afternoon with labs .    Javid Rodriguez MD  [facility] RHEUMATOLOGY      Subjective     Current visit November 21, 2023    Chelsea Peacock is a 78 year old female who presents today for consult      For the past one month, She has been noticing pain in the wrists bilaterally with morning stiffness for few hours, with pain in MCPs. She also had pain and stiffness in both shoulders and upper back, lower back and hips. She did not have pain or swelling in her feet or toes. She did not have much involvement in her knees.    She did not have headache, scalp tenderness, jaw claudications, vision changes, upper or lower extremity claudications. She did not have fevers, weight loss, loss of appetite.  She did not have cough, shortness of breath, dysuria, hematuria    She was evaluated in the ER on 11/17 with labs showed elevated C-reactive protein of 53.5, ESR 30, hemoglobin 13.2, WBC 8.2, platelet count 282, AST 28, ALT 6, creatinine 1.03.    She was prescribed prednisone 60 mg daily for 5 days, She noticed 75% improvement on prednisone.     Of note, She presented emergency room in August 2014 with trochanteric bursitis    Former smoker, Quit 20 years ago  No family history of RA or other autoimmune disease    ROS as above    Past Medical history   Hypertension    Current Medications   Prednisone 60 mg daily       Objective   BP 98/56 (BP Location: Left arm, Patient Position: Sitting, Cuff Size: Adult Regular)   Pulse 56   Wt 68 kg (150 lb)   SpO2 97%   BMI 26.57 kg/m        PHYSICAL EXAMINATION  Physical Exam  HENT:      Ears:      Comments: Temporal artery: Bilaterally palpable without tenderness     Mouth/Throat:       Mouth: Mucous membranes are moist.   Eyes:      Conjunctiva/sclera: Conjunctivae normal.   Cardiovascular:      Pulses: Normal pulses.      Heart sounds: Normal heart sounds.   Pulmonary:      Effort: Pulmonary effort is normal.      Breath sounds: Normal breath sounds.   Abdominal:      Palpations: Abdomen is soft.   Musculoskeletal:         General: Tenderness present. No swelling.      Cervical back: Normal range of motion.      Comments: Limited range of motion in the left shoulder compared to right. Tenderness over both greater trochanter.    No evidence of synovitis (MCP, PIPs, DIPs, Wrists, Elbows, knees, ankles or feet)     Skin:     Findings: No rash.   Neurological:      General: No focal deficit present.      Mental Status: She is alert.       Review of labs    CCP antibody: Negative  Rheumatoid factor: Negative    Imaging    X-ray right foot, 12/20/2015: Degenerative changes  .

## 2023-11-21 NOTE — PATIENT INSTRUCTIONS
Plan  1- Prednisone 25 mg daily for 4 weeks then prednisone 20 mg daily for 3 weeks   2- Inhalation Pentamidine while on high dose prednisone  3- Labs today  4- X-ray and DEXA scan  5- Labs few days before the next visit

## 2023-11-21 NOTE — LETTER
11/21/2023       RE: Chelsea Peacock  [address]  [place] [address]     Dear Colleague,    Thank you for referring your patient, Chelsea Peacock, to the [facility] RHEUMATOLOGY at [facility]. Please see a copy of my visit note below.    Assessment & Plan    Chelsea is a very pleasant 78-year-old female with past medical history of hypertension.  She presented to the emergency room with bilateral shoulder and hip pain and morning stiffness as well as pain and morning stiffness in the wrists.  She did not have symptoms suggestive of GCA or systemic symptoms.  She has history of trochanteric bursitis few years ago.  She started on prednisone 60 mg with significant improvement in her symptoms.    She remained with bilateral tenderness over the greater trochanters but otherwise good range of motion in the shoulders and wrists bilaterally.    We discussed that her presentation is consistent with polymyalgia rheumatica.  We discussed the diagnosis at length including the need for prednisone therapy for prolonged duration.  She has symptoms involving her wrists but not much in the MCP PIP distribution, she had negative rheumatoid factor and CCP but nonetheless we discussed getting x-rays of the hands and feet to to complete the work-up.     We discussed side effects of prolonged prednisone therapy as well and we discussed getting baseline screening for chronic infections as well as DEXA scan      PLAN:      Polymyalgia rheumatica (H24)  - predniSONE (DELTASONE) 5 MG tablet; Take 5 tablets (25 mg) by mouth daily for 28 days, THEN 4 tablets (20 mg) daily for 21 days.  - Erythrocyte sedimentation rate auto; Future  - CRP inflammation; Future  - XR Foot Bilateral G/E 3 Views; Future  - XR Hand Bilateral G/E 3 Views; Future    Long-term corticosteroid use  - Inhaled Pentamidine given bactrim allergy   - Dexa hip/pelvis/spine; Future    Need for hepatitis C screening test  -  Hepatitis C Screen Reflex to HCV RNA Quant and Genotype; Future    Screening for tuberculosis  - Quantiferon-TB Gold Plus; Future    Need for hepatitis B screening test  - Hepatitis B surface antigen; Future  - Hepatitis B Surface Antibody; Future  - Hepatitis B core antibody; Future    Encounter for screening for HIV  - HIV Antigen Antibody Combo Cascade; Future       65 minutes spent by me on the date of the encounter doing chart review, history and exam, documentation and further activities per the note      Return in about 7 weeks (around 1/9/2024) for Tuesday afternoon with labs .    Javid Rodriguez MD  [facility] RHEUMATOLOGY      Subjective    Current visit November 21, 2023    Chelsea Peacock is a 78 year old female who presents today for consult      For the past one month, She has been noticing pain in the wrists bilaterally with morning stiffness for few hours, with pain in MCPs. She also had pain and stiffness in both shoulders and upper back, lower back and hips. She did not have pain or swelling in her feet or toes. She did not have much involvement in her knees.    She did not have headache, scalp tenderness, jaw claudications, vision changes, upper or lower extremity claudications. She did not have fevers, weight loss, loss of appetite.  She did not have cough, shortness of breath, dysuria, hematuria    She was evaluated in the ER on 11/17 with labs showed elevated C-reactive protein of 53.5, ESR 30, hemoglobin 13.2, WBC 8.2, platelet count 282, AST 28, ALT 6, creatinine 1.03.    She was prescribed prednisone 60 mg daily for 5 days, She noticed 75% improvement on prednisone.     Of note, She presented emergency room in August 2014 with trochanteric bursitis    Former smoker, Quit 20 years ago  No family history of RA or other autoimmune disease    ROS as above    Past Medical history   Hypertension    Current Medications   Prednisone 60 mg daily       Objective  BP 98/56 (BP Location:  Left arm, Patient Position: Sitting, Cuff Size: Adult Regular)   Pulse 56   Wt 68 kg (150 lb)   SpO2 97%   BMI 26.57 kg/m        PHYSICAL EXAMINATION  Physical Exam  HENT:      Ears:      Comments: Temporal artery: Bilaterally palpable without tenderness     Mouth/Throat:      Mouth: Mucous membranes are moist.   Eyes:      Conjunctiva/sclera: Conjunctivae normal.   Cardiovascular:      Pulses: Normal pulses.      Heart sounds: Normal heart sounds.   Pulmonary:      Effort: Pulmonary effort is normal.      Breath sounds: Normal breath sounds.   Abdominal:      Palpations: Abdomen is soft.   Musculoskeletal:         General: Tenderness present. No swelling.      Cervical back: Normal range of motion.      Comments: Limited range of motion in the left shoulder compared to right. Tenderness over both greater trochanter.    No evidence of synovitis (MCP, PIPs, DIPs, Wrists, Elbows, knees, ankles or feet)     Skin:     Findings: No rash.   Neurological:      General: No focal deficit present.      Mental Status: She is alert.       Review of labs    CCP antibody: Negative  Rheumatoid factor: Negative    Imaging    X-ray right foot, 12/20/2015: Degenerative changes  .

## 2023-11-21 NOTE — NURSING NOTE
Chief Complaint   Patient presents with    Consult     BP 98/56 (BP Location: Left arm, Patient Position: Sitting, Cuff Size: Adult Regular)   Pulse 56   Wt 68 kg (150 lb)   SpO2 97%   BMI 26.57 kg/m

## 2023-11-24 RX ORDER — PENTAMIDINE ISETHIONATE 300 MG/300MG
300 INHALANT RESPIRATORY (INHALATION) ONCE
Status: DISCONTINUED | OUTPATIENT
Start: 2023-12-01 | End: 2023-11-24

## 2023-11-28 ENCOUNTER — ANCILLARY PROCEDURE (OUTPATIENT)
Dept: BONE DENSITY | Facility: CLINIC | Age: 78
End: 2023-11-28
Attending: STUDENT IN AN ORGANIZED HEALTH CARE EDUCATION/TRAINING PROGRAM
Payer: COMMERCIAL

## 2023-11-28 DIAGNOSIS — Z79.52 LONG-TERM CORTICOSTEROID USE: ICD-10-CM

## 2023-11-28 PROCEDURE — 77080 DXA BONE DENSITY AXIAL: CPT | Performed by: INTERNAL MEDICINE

## 2023-11-30 RX ORDER — PENTAMIDINE ISETHIONATE 300 MG/300MG
300 INHALANT RESPIRATORY (INHALATION) ONCE
Status: COMPLETED | OUTPATIENT
Start: 2023-12-01 | End: 2023-12-01

## 2023-11-30 RX ORDER — ALBUTEROL SULFATE 0.83 MG/ML
2.5 SOLUTION RESPIRATORY (INHALATION) ONCE
Status: COMPLETED | OUTPATIENT
Start: 2023-12-01 | End: 2023-12-01

## 2023-12-01 ENCOUNTER — HOSPITAL ENCOUNTER (OUTPATIENT)
Dept: RESPIRATORY THERAPY | Facility: CLINIC | Age: 78
Discharge: HOME | End: 2023-12-01
Attending: STUDENT IN AN ORGANIZED HEALTH CARE EDUCATION/TRAINING PROGRAM | Admitting: STUDENT IN AN ORGANIZED HEALTH CARE EDUCATION/TRAINING PROGRAM
Payer: COMMERCIAL

## 2023-12-01 ENCOUNTER — TELEPHONE (OUTPATIENT)
Dept: RHEUMATOLOGY | Facility: CLINIC | Age: 78
End: 2023-12-01
Payer: COMMERCIAL

## 2023-12-01 VITALS — DIASTOLIC BLOOD PRESSURE: 60 MMHG | OXYGEN SATURATION: 96 % | SYSTOLIC BLOOD PRESSURE: 123 MMHG

## 2023-12-01 PROCEDURE — 999N000157 HC STATISTIC RCP TIME EA 10 MIN

## 2023-12-01 PROCEDURE — 94640 AIRWAY INHALATION TREATMENT: CPT | Mod: 76

## 2023-12-01 PROCEDURE — 250N000009 HC RX 250: Performed by: STUDENT IN AN ORGANIZED HEALTH CARE EDUCATION/TRAINING PROGRAM

## 2023-12-01 RX ADMIN — PENTAMIDINE ISETHIONATE 300 MG: 300 INHALANT RESPIRATORY (INHALATION) at 13:57

## 2023-12-01 RX ADMIN — ALBUTEROL SULFATE 2.5 MG: 2.5 SOLUTION RESPIRATORY (INHALATION) at 13:45

## 2023-12-01 NOTE — TELEPHONE ENCOUNTER
[facility] Call Center    Phone Message    May a detailed message be left on voicemail: yes     Reason for Call: Order(s): Other:   Reason for requested: Pentamidine Nebulizer  Date needed: 12/28/23  Provider name: Jennifer    Encounter sent to ensure orders are in place for pt's next pentamidine nebulizer scheduled 12/28/23.     Action Taken: Message routed to:  [facility]    Travel Screening: Not Applicable

## 2023-12-01 NOTE — TELEPHONE ENCOUNTER
Centralized Medication Refill Team note: sent to Rheumatology for orders.   Pentamidine standing orders are not managed/ processed by Central Refills.    EVERETT George R.N.   [facility] Centralized Medication Refill Department             LVD 11/21/2023  [facility] Rheumatology       pentamidine (NEBUPENT) neb solution 300 mg   [[id number]]  Order DetailsOrdered Dose: 300 mg Route: Inhalation Frequency: ONCE   Admin Dose: 300 mg      Scheduled Start Date/Time: 12/01/23 1400 End Date/Time: 12/01/23 1357 after 1 doses

## 2023-12-01 NOTE — PROGRESS NOTES
Patient came into PFT lab for Q 28 day Pentamidine neb. PT on RA. SPO2 96-97%, HR 51-60, RR 16-18, BS clear, and /57. Pt given Albuterol neb prior to Pentamidine neb. After neb treatments SPO2 96%, HR 54, RR 18, BS clear, and /60. Pt left in no distress.   Kristy Covington, RT

## 2023-12-04 NOTE — TELEPHONE ENCOUNTER
Placed call to patient, who said she was requesting a paper copy of her upcoming pentamidine inhaler appointment as she isn't comfortable using MyChart. 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  Karla Pepe RN  [facility]

## 2023-12-18 ENCOUNTER — MYC MEDICAL ADVICE (OUTPATIENT)
Dept: RHEUMATOLOGY | Facility: CLINIC | Age: 78
End: 2023-12-18
Payer: COMMERCIAL

## 2023-12-18 DIAGNOSIS — M81.8 OSTEOPOROSIS, IDIOPATHIC: Primary | ICD-10-CM

## 2023-12-18 NOTE — RESULT ENCOUNTER NOTE
DEXA scan showing osteoporosis.     Plan  1- initiate anti-resorptive therapy    2- [facility] pharmacy referral to discuss Bisphosphonate

## 2023-12-20 ENCOUNTER — TELEPHONE (OUTPATIENT)
Dept: RHEUMATOLOGY | Facility: CLINIC | Age: 78
End: 2023-12-20
Payer: COMMERCIAL

## 2023-12-20 NOTE — TELEPHONE ENCOUNTER
[facility] referral from: [facility] visit (referral by provider)    [facility] referral outreach attempt #2 on December 20, 2023 at 8:24 AM      Outcome: Patient not reachable after several attempts, will route to [facility] Pharmacist/Provider as an FYI.  MT scheduling number is .  Thank you for the referral.    Use hbc for the carrier/Plan on the flowsheet      [employer] Message Sent    Madhavi Vargas CPhT  MT

## 2023-12-23 ENCOUNTER — HEALTH MAINTENANCE LETTER (OUTPATIENT)
Age: 78
End: 2023-12-23

## 2023-12-27 RX ORDER — PENTAMIDINE ISETHIONATE 300 MG/300MG
300 INHALANT RESPIRATORY (INHALATION)
Status: DISCONTINUED | OUTPATIENT
Start: 2023-12-28 | End: 2023-12-29 | Stop reason: HOSPADM

## 2023-12-27 RX ORDER — ALBUTEROL SULFATE 0.83 MG/ML
2.5 SOLUTION RESPIRATORY (INHALATION)
Status: DISCONTINUED | OUTPATIENT
Start: 2023-12-28 | End: 2023-12-29 | Stop reason: HOSPADM

## 2023-12-27 NOTE — TELEPHONE ENCOUNTER
Left message with patient to call me back at [phone] to schedule an appointment.    Joseph Cardoso, PharmD  Medication Therapy Management Pharmacist  [facility]  Phone: [phone]

## 2023-12-28 ENCOUNTER — HOSPITAL ENCOUNTER (OUTPATIENT)
Dept: RESPIRATORY THERAPY | Facility: CLINIC | Age: 78
Discharge: HOME | End: 2023-12-28
Attending: STUDENT IN AN ORGANIZED HEALTH CARE EDUCATION/TRAINING PROGRAM | Admitting: STUDENT IN AN ORGANIZED HEALTH CARE EDUCATION/TRAINING PROGRAM
Payer: COMMERCIAL

## 2023-12-28 VITALS — DIASTOLIC BLOOD PRESSURE: 58 MMHG | SYSTOLIC BLOOD PRESSURE: 108 MMHG | OXYGEN SATURATION: 98 %

## 2023-12-28 PROCEDURE — 94642 AEROSOL INHALATION TREATMENT: CPT

## 2023-12-28 PROCEDURE — 999N000157 HC STATISTIC RCP TIME EA 10 MIN

## 2023-12-28 PROCEDURE — 250N000009 HC RX 250: Performed by: STUDENT IN AN ORGANIZED HEALTH CARE EDUCATION/TRAINING PROGRAM

## 2023-12-28 RX ADMIN — ALBUTEROL SULFATE 2.5 MG: 2.5 SOLUTION RESPIRATORY (INHALATION) at 07:08

## 2023-12-28 RX ADMIN — PENTAMIDINE ISETHIONATE 300 MG: 300 INHALANT RESPIRATORY (INHALATION) at 07:19

## 2023-12-28 NOTE — PROGRESS NOTES
Patient came into PFT lab for Q 28 day Pentamidine neb. PT on RA. SPO2 98-99%, HR 81-86, RR 16-18, BS clear, and /59. Pt given Albuterol neb prior to Pentamidine neb. After neb treatments SPO2 97%, HR 94, RR 16, BS clear, and /58. Pt left in no distress.   Kristy Covington, RT

## 2024-01-03 NOTE — TELEPHONE ENCOUNTER
Left second message asking patient to call me back at [phone] to schedule appointment to discuss alendronate.    Joseph Cardoso, PharmD  Medication Therapy Management Pharmacist  [facility]  Phone: [phone]

## 2024-01-04 ENCOUNTER — LAB (OUTPATIENT)
Dept: LAB | Facility: CLINIC | Age: 79
End: 2024-01-04
Payer: COMMERCIAL

## 2024-01-04 DIAGNOSIS — Z11.4 ENCOUNTER FOR SCREENING FOR HIV: ICD-10-CM

## 2024-01-04 DIAGNOSIS — M35.3 POLYMYALGIA RHEUMATICA (H): ICD-10-CM

## 2024-01-04 DIAGNOSIS — Z11.59 NEED FOR HEPATITIS B SCREENING TEST: ICD-10-CM

## 2024-01-04 DIAGNOSIS — Z11.1 SCREENING FOR TUBERCULOSIS: ICD-10-CM

## 2024-01-04 DIAGNOSIS — Z11.59 NEED FOR HEPATITIS C SCREENING TEST: ICD-10-CM

## 2024-01-04 LAB
AST SERPL W P-5'-P-CCNC: 27 U/L (ref 0–45)
BASOPHILS # BLD AUTO: 0 10E3/UL (ref 0–0.2)
BASOPHILS NFR BLD AUTO: 0 %
CREAT SERPL-MCNC: 1.44 MG/DL (ref 0.51–0.95)
CRP SERPL-MCNC: 10.5 MG/L
EGFRCR SERPLBLD CKD-EPI 2021: 37 ML/MIN/1.73M2
EOSINOPHIL # BLD AUTO: 0.2 10E3/UL (ref 0–0.7)
EOSINOPHIL NFR BLD AUTO: 2 %
ERYTHROCYTE [DISTWIDTH] IN BLOOD BY AUTOMATED COUNT: 16.2 % (ref 10–15)
ERYTHROCYTE [SEDIMENTATION RATE] IN BLOOD BY WESTERGREN METHOD: 16 MM/HR (ref 0–30)
HBV CORE AB SERPL QL IA: NONREACTIVE
HBV SURFACE AB SERPL IA-ACNC: 430 M[IU]/ML
HBV SURFACE AB SERPL IA-ACNC: REACTIVE M[IU]/ML
HBV SURFACE AG SERPL QL IA: NONREACTIVE
HCT VFR BLD AUTO: 38.6 % (ref 35–47)
HCV AB SERPL QL IA: NONREACTIVE
HGB BLD-MCNC: 12.5 G/DL (ref 11.7–15.7)
HIV 1+2 AB+HIV1 P24 AG SERPL QL IA: NONREACTIVE
IMM GRANULOCYTES # BLD: 0.1 10E3/UL
IMM GRANULOCYTES NFR BLD: 1 %
LYMPHOCYTES # BLD AUTO: 2 10E3/UL (ref 0.8–5.3)
LYMPHOCYTES NFR BLD AUTO: 17 %
MCH RBC QN AUTO: 28.9 PG (ref 26.5–33)
MCHC RBC AUTO-ENTMCNC: 32.4 G/DL (ref 31.5–36.5)
MCV RBC AUTO: 89 FL (ref 78–100)
MONOCYTES # BLD AUTO: 0.8 10E3/UL (ref 0–1.3)
MONOCYTES NFR BLD AUTO: 7 %
NEUTROPHILS # BLD AUTO: 8.4 10E3/UL (ref 1.6–8.3)
NEUTROPHILS NFR BLD AUTO: 73 %
PLATELET # BLD AUTO: 373 10E3/UL (ref 150–450)
RBC # BLD AUTO: 4.32 10E6/UL (ref 3.8–5.2)
WBC # BLD AUTO: 11.5 10E3/UL (ref 4–11)

## 2024-01-04 PROCEDURE — 82565 ASSAY OF CREATININE: CPT

## 2024-01-04 PROCEDURE — 85025 COMPLETE CBC W/AUTO DIFF WBC: CPT

## 2024-01-04 PROCEDURE — 36415 COLL VENOUS BLD VENIPUNCTURE: CPT

## 2024-01-04 PROCEDURE — 86803 HEPATITIS C AB TEST: CPT

## 2024-01-04 PROCEDURE — 86140 C-REACTIVE PROTEIN: CPT

## 2024-01-04 PROCEDURE — 87389 HIV-1 AG W/HIV-1&-2 AB AG IA: CPT

## 2024-01-04 PROCEDURE — 85652 RBC SED RATE AUTOMATED: CPT

## 2024-01-04 PROCEDURE — 86704 HEP B CORE ANTIBODY TOTAL: CPT

## 2024-01-04 PROCEDURE — 84450 TRANSFERASE (AST) (SGOT): CPT

## 2024-01-04 PROCEDURE — 87340 HEPATITIS B SURFACE AG IA: CPT

## 2024-01-04 PROCEDURE — 86706 HEP B SURFACE ANTIBODY: CPT

## 2024-01-04 PROCEDURE — 86481 TB AG RESPONSE T-CELL SUSP: CPT

## 2024-01-06 LAB
GAMMA INTERFERON BACKGROUND BLD IA-ACNC: 0.09 IU/ML
M TB IFN-G BLD-IMP: NEGATIVE
M TB IFN-G CD4+ BCKGRND COR BLD-ACNC: 6.76 IU/ML
MITOGEN IGNF BCKGRD COR BLD-ACNC: 0.01 IU/ML
MITOGEN IGNF BCKGRD COR BLD-ACNC: 0.01 IU/ML
QUANTIFERON MITOGEN: 6.85 IU/ML
QUANTIFERON NIL TUBE: 0.09 IU/ML
QUANTIFERON TB1 TUBE: 0.1 IU/ML
QUANTIFERON TB2 TUBE: 0.1

## 2024-01-09 ENCOUNTER — TELEPHONE (OUTPATIENT)
Dept: RHEUMATOLOGY | Facility: CLINIC | Age: 79
End: 2024-01-09
Payer: COMMERCIAL

## 2024-01-09 DIAGNOSIS — M35.3 POLYMYALGIA RHEUMATICA (H): Primary | ICD-10-CM

## 2024-01-09 DIAGNOSIS — M31.6 OTHER GIANT CELL ARTERITIS (H): ICD-10-CM

## 2024-01-09 NOTE — TELEPHONE ENCOUNTER
"Spoke  to patient.    Patient states her joints are very painful, especially her shoulders and her knees hurt.   In addition, she states she has been dealing with periodic right jaw and right ear pain.  It began weeks after she had her visit with Dr. Rodriguez.  The pain lasts  30-40 seconds, goes away, then comes back at unexpected times.  The pain in her jaw makes it difficult to open her mouth to eat.   Currently, she has no symptoms.  Patient denies any changes in vision.    Patient states she had just as much pain on prednisone, there were no changes according to the patient.   She completed it \"some time ago\"     Patient is asking what to do in the mean time, therapy or pain pills.  Discussed recommendations will come from Dr. Rodriguez, she may receive new Rx of prednisone.  She agrees to plan.    Deactivate patient MyChart and read the message from Dr. Rodriguez regarding her Dexa scan results. Patient verbalized understanding and agreed to plan Informed that she was also missing messages from pharmacist scheduling to discuss medication, alendronate.  She will call the scheduling number.    Routing to provider to please advise.    Mellisa Geller RN    "

## 2024-01-09 NOTE — TELEPHONE ENCOUNTER
Humza!  Pt missed her appt today because of confusion with scheduling. Per Dr. Rodriguez I rescheduled her for 1/30 at 1030 am.  Pt states though that she is have such extreme pain during the day that it is causing her to become physically ill.  Wondering if there is something she can do between now and her appt on 1/30 to help control this pain.

## 2024-01-10 ENCOUNTER — VIRTUAL VISIT (OUTPATIENT)
Dept: RHEUMATOLOGY | Facility: CLINIC | Age: 79
End: 2024-01-10
Attending: STUDENT IN AN ORGANIZED HEALTH CARE EDUCATION/TRAINING PROGRAM
Payer: COMMERCIAL

## 2024-01-10 DIAGNOSIS — Z71.85 VACCINE COUNSELING: ICD-10-CM

## 2024-01-10 DIAGNOSIS — M81.8 OSTEOPOROSIS, IDIOPATHIC: Primary | ICD-10-CM

## 2024-01-10 DIAGNOSIS — E78.5 HYPERLIPIDEMIA, UNSPECIFIED HYPERLIPIDEMIA TYPE: ICD-10-CM

## 2024-01-10 DIAGNOSIS — I10 HYPERTENSION, UNSPECIFIED TYPE: ICD-10-CM

## 2024-01-10 DIAGNOSIS — Z78.9 TAKES DIETARY SUPPLEMENTS: ICD-10-CM

## 2024-01-10 DIAGNOSIS — M35.3 POLYMYALGIA RHEUMATICA (H): ICD-10-CM

## 2024-01-10 RX ORDER — AMOXICILLIN 500 MG
1200 CAPSULE ORAL DAILY
COMMUNITY

## 2024-01-10 RX ORDER — GABAPENTIN 100 MG/1
200 CAPSULE ORAL DAILY
COMMUNITY
End: 2024-01-17

## 2024-01-10 RX ORDER — METOPROLOL SUCCINATE 25 MG/1
25 TABLET, EXTENDED RELEASE ORAL DAILY
Status: ON HOLD | COMMUNITY
End: 2024-01-21

## 2024-01-10 RX ORDER — RISEDRONATE SODIUM 35 MG/1
35 TABLET, FILM COATED ORAL
Qty: 12 TABLET | Refills: 1 | Status: SHIPPED | OUTPATIENT
Start: 2024-01-10

## 2024-01-10 NOTE — PROGRESS NOTES
Medication Therapy Management (MTM) Encounter    ASSESSMENT:                            Medication Adherence/Access: No issues identified    Osteoporosis: Patient would benefit from starting bisphosphonate therapy. Based on patient's renal function, recommend risedronate 35 mg once weekly as alendronate is not recommended when CrCl <35 mL/min. Provided education on risedronate today including dosing, general administration, side effects (both common/serious), precautions, monitoring and average duration of therapy.    Polymyalgia rheumatica: Patient would benefit from following up with Dr. Rodriguez to discuss treatment options.    Hypertension: Stable    Hyperlipidemia: Stable    Vaccines: Per ACIP guidelines, patient is eligible for: Covid-19, RSV and Pneumococcal (Prevnar 20).    Supplements: Recommend patient start calcium supplement to achieve daily intake of 1200 mg daily.    PLAN:                            Start taking risedronate 35 mg once weekly. Take it 30 minutes before any food, drink, or other medications. Remain upright for at least 30 minutes after taking.  Start taking a calcium supplement and take one tablet twice daily. Aim for at least 1200 mg of calcium per day.  Consider receiving the following vaccinations:  Prevnar 20 (pneumococcal)  COVID  RSV    Follow-up: Return in 9 weeks (on 3/13/2024) for Follow up, with me, using a phone visit.    SUBJECTIVE/OBJECTIVE:                          Chelsea Peacock is a 78 year old female called for an initial visit. She was referred to me from Javid Rodriguez MD.      Reason for visit: Alendronate new start.    Allergies/ADRs: Reviewed in chart  Past Medical History: Reviewed in chart  Tobacco: She reports that she has quit smoking. Her smoking use included cigarettes. She has never used smokeless tobacco.  Alcohol: Less than 1 beverage / month  Caffeine: Drinks varying amount of coffee each day. Sometimes none and other days has a few cups.    Medication  Adherence/Access: Previously used pill box, but found that it didn't work for her. States she rarely misses doses. Take pills in evening roughly 6-7 PM    Osteoporosis:  Not currently on medications. Rheumatologist recommended starting alendronate after getting results of DEXA scan.    DEXA scan results on 11/28/2023:    Lumbar Spine  T-score -0.2 ., BMD is 1.156 g/cm2.  Left femoral neck  T-score -2.5   Right femoral neck  T-score -1.6   Left Total hip  T-score -2. , BMD is 0.693 g/cm2.  Right total hip  T-score -2, BMD is 0.755 g/cm2    Current daily calcium intake: 300 mg daily in supplements. Doesn't have much for dietary intake per patient.  Current vitamin D intake: 6000 units daily in supplements    Polymyalgia rheumatica:  Gabapentin 200 mg daily at bedtime  Acetaminophen 1000 mg once daily hasn't taken it in past few days.    Reports that mornings are lousy and she gets sick to her stomach from the pain sometimes. Has pain in her hands, shoulders, knees, neck. States she has never been this miserable in her whole life. Has trouble sleeping because of the pain.    CBC RESULTS:   Recent Labs   Lab Test 01/04/24  1144   WBC 11.5*   RBC 4.32   HGB 12.5   HCT 38.6   MCV 89   MCH 28.9   MCHC 32.4   RDW 16.2*        Last Comprehensive Metabolic Panel:  Lab Results   Component Value Date     11/17/2023    POTASSIUM 4.4 11/17/2023    CHLORIDE 104 11/17/2023    CO2 25 11/17/2023    ANIONGAP 13 11/17/2023    GLC 95 11/17/2023    BUN 24.5 (H) 11/17/2023    CR 1.44 (H) 01/04/2024    GFRESTIMATED 37 (L) 01/04/2024    TRACEY 9.6 11/17/2023     Liver Function Studies -   Recent Labs   Lab Test 01/04/24  1144 11/17/23  1430   PROTTOTAL  --  7.0   ALBUMIN  --  3.8   BILITOTAL  --  0.4   ALKPHOS  --  56   AST 27 28   ALT  --  6     Hypertension   Lisinopril 40 mg daily  Furosemide 20 mg once daily  Metoprolol succinate ER 25 mg daily    Patient reports no current medication side effects  Patient does not  self-monitor blood pressure.       BP Readings from Last 3 Encounters:   12/28/23 108/58   12/01/23 123/60   11/21/23 98/56     Pulse Readings from Last 3 Encounters:   11/21/23 56   11/17/23 68      Hyperlipidemia:   Pravastatin 80 mg daily    Patient reports no current medication side effects.    Recent Labs   Lab Test 05/24/23  0945 11/16/22  1136   CHOL 149 143   HDL 56 52   LDL 72 73   TRIG 106 91      Vaccines:  Immunization History   Administered Date(s) Administered    COVID-19 Bivalent 12+ (Pfizer) 11/16/2022    COVID-19 MONOVALENT 12+ (Pfizer) 03/03/2021, 03/24/2021, 01/31/2022     Declines flu - previously received, but got sick from them.    Supplements:  Vitamin D3 5000 units daily  Vitamin B12 1000 mcg daily  Glucosamine 1500 mg once daily  Potassium chloride ER 10 mEq daily  Multivitamin daily  Fish oil 1200 mg daily    No reported issues at this time.     Today's Vitals: There were no vitals taken for this visit.  ----------------    I spent 44 minutes with this patient today. All changes were made via collaborative practice agreement with Javid Rodriguez. A copy of the visit note was provided to the patient's provider(s).    A summary of these recommendations was mailed to the patient.    Joseph Cardoso, PharmD  Medication Therapy Management Pharmacist  [facility]  Phone: [phone]     Telemedicine Visit Details  Type of service:  Telephone visit  Start Time: 2:00 PM  End Time: 2:44 PM     Medication Therapy Recommendations  Osteoporosis, idiopathic    Rationale: Untreated condition - Needs additional medication therapy - Indication   Recommendation: Start Medication - Calcium 600+D 600-10 MG-MCG Tabs   Status: Accepted per CPA          Rationale: Untreated condition - Needs additional medication therapy - Indication   Recommendation: Start Medication - RISEdronate 35 MG tablet   Status: Contact Provider - Awaiting Response         Vaccine counseling    Rationale:  Preventive therapy - Needs additional medication therapy - Indication   Recommendation: Provide Education   Status: Patient Agreed - Adherence/Education   Note: Recommend Prevnar 20, COVID and RSV

## 2024-01-10 NOTE — Clinical Note
1/10/2024       RE: Chelsea Peacock  [address]  [place] [address]     Dear Colleague,    Thank you for referring your patient, Chelsea Peacock, to the [facility] [place] at [facility]. Please see a copy of my visit note below.    Medication Therapy Management (MTM) Encounter    ASSESSMENT:                            Medication Adherence/Access: {adherencechoices:533055}    Osteoporosis:    Polymyalgia rheumatica:    Hypertension:   {Status:235370}    Hyperlipidemia:   {lipidassessment:971916}    Vaccines: Per ACIP guidelines, patient is eligible for {VACCINATION:423213:::1}     Supplements:    PLAN:                            Start taking risedronate 35 mg once weekly. Take it 30 minutes before any food, drink, or other medications. Remain upright for at least 30 minutes after taking.    Follow-up: {followuptest2:196524}    SUBJECTIVE/OBJECTIVE:                          Chelsea Peacock is a 78 year old female called for an initial visit. She was referred to me from Javid Rodriguez MD. {mtmvisitdetails:390776}     Reason for visit: Alendronate new start.    Allergies/ADRs: {1/2/3/4/5:749088}  Past Medical History: {1/2/3/4/5:280701}  Tobacco: She reports that she has quit smoking. Her smoking use included cigarettes. She has never used smokeless tobacco.  Alcohol: {ALCOHOL CONSUMPTION HX:700410}  {Social and Goals:960930}    Medication Adherence/Access: {fumedadherence:059687}    Osteoporosis:  DEXA scan results on 11/28/2023:    Lumbar Spine  T-score -0.2 ., BMD is 1.156 g/cm2.  Left femoral neck  T-score -2.5   Right femoral neck  T-score -1.6   Left Total hip  T-score -2. , BMD is 0.693 g/cm2.  Right total hip  T-score -2, BMD is 0.755 g/cm2    Current daily calcium intake: 1000 mg per day in supplements  Current vitamin D intake: 1400 units daily in supplements    Polymyalgia rheumatica:   Prednisone taper  Ibuprofen 600 mg daily    CBC  RESULTS:   Recent Labs   Lab Test 01/04/24  1144   WBC 11.5*   RBC 4.32   HGB 12.5   HCT 38.6   MCV 89   MCH 28.9   MCHC 32.4   RDW 16.2*        Last Comprehensive Metabolic Panel:  Lab Results   Component Value Date     11/17/2023    POTASSIUM 4.4 11/17/2023    CHLORIDE 104 11/17/2023    CO2 25 11/17/2023    ANIONGAP 13 11/17/2023    GLC 95 11/17/2023    BUN 24.5 (H) 11/17/2023    CR 1.44 (H) 01/04/2024    GFRESTIMATED 37 (L) 01/04/2024    TRACEY 9.6 11/17/2023     Liver Function Studies -   Recent Labs   Lab Test 01/04/24  1144 11/17/23  1430   PROTTOTAL  --  7.0   ALBUMIN  --  3.8   BILITOTAL  --  0.4   ALKPHOS  --  56   AST 27 28   ALT  --  6     Hypertension  Lisinopril 40 mg daily  Furosemide 20 mg twice daily    Patient reports {side effects:073385}  Patient {HTNDoes/doesnot:583566}.       BP Readings from Last 3 Encounters:   12/28/23 108/58   12/01/23 123/60   11/21/23 98/56     Pulse Readings from Last 3 Encounters:   11/21/23 56   11/17/23 68      Hyperlipidemia:   Pravastatin 80 mg daily  Patient reports { :707322}.  {lipidascvd:467463}  Recent Labs   Lab Test 05/24/23  0945 11/16/22  1136   CHOL 149 143   HDL 56 52   LDL 72 73   TRIG 106 91      Vaccines:  Immunization History   Administered Date(s) Administered    COVID-19 Bivalent 12+ (Pfizer) 11/16/2022    COVID-19 MONOVALENT 12+ (Pfizer) 03/03/2021, 03/24/2021, 01/31/2022     Supplements:   Calcium/vitamin D 500 mg/200 units twice daily  Vitamin D3 1000 units daily  Vitamin B12 1000 mcg daily  Glucosamine 500 mg three tablets daily  Magnesium 200 mg daily  Potassium chloride ER 10 mEq daily  Multivitamin daily    {supplement:353038}        Today's Vitals: There were no vitals taken for this visit.  ----------------    I spent {[facility] total time 3:652929} with this patient today. All changes were made via collaborative practice agreement with Javid Rodriguez A copy of the visit note was provided to the patient's provider(s).    A summary  of these recommendations was mailed to the patient.    Joseph Cardoso, PharmD  Medication Therapy Management Pharmacist  [facility]  Phone: [phone]     Telemedicine Visit Details  Type of service:  Telephone visit  Start Time: 2:00 PM  End Time: {video/phone visit end time:879524}     Medication Therapy Recommendations  No medication therapy recommendations to display       Medication Therapy Management (MTM) Encounter    ASSESSMENT:                            Medication Adherence/Access: No issues identified    Osteoporosis: Patient would benefit from starting bisphosphonate therapy. Based on patient's renal function, recommend risedronate 35 mg once weekly as alendronate is not recommended when CrCl <35 mL/min. Provided education on risedronate today including dosing, general administration, side effects (both common/serious), precautions, monitoring and time to efficacy.    Polymyalgia rheumatica: Patient would benefit from following up with Dr. Rodriguez to discuss treatment options.    Hypertension: Stable    Hyperlipidemia: Stable    Vaccines: Per ACIP guidelines, patient is eligible for: Covid-19, RSV and Pneumococcal (Prevnar 20).    Supplements: Recommend patient start calcium supplement to achieve daily intake of 1200 mg daily.    PLAN:                            Start taking risedronate 35 mg once weekly. Take it 30 minutes before any food, drink, or other medications. Remain upright for at least 30 minutes after taking.  Start taking a calcium supplement and take one tablet twice daily. Aim for at least 1200 mg of calcium per day.  Consider receiving the following vaccinations:  Prevnar 20 (pneumococcal)  COVID  RSV    Follow-up: Return in 9 weeks (on 3/13/2024) for Follow up, with me, using a phone visit.    SUBJECTIVE/OBJECTIVE:                          Chelsea Peacock is a 78 year old female called for an initial visit. She was referred to me from Javid Rodriguez MD.       Reason for visit: Alendronate new start.    Allergies/ADRs: Reviewed in chart  Past Medical History: Reviewed in chart  Tobacco: She reports that she has quit smoking. Her smoking use included cigarettes. She has never used smokeless tobacco.  Alcohol: Less than 1 beverage / month  Caffeine: Drinks coffee    Medication Adherence/Access: Previously used pill box, but found that it didn't work for her. State she rarely misses doses. Take pills in evening roughly 6-7 PM    Osteoporosis:  Not currently on medications. Rheumatologist recommended starting alendronate after getting results of DEXA scan.    DEXA scan results on 11/28/2023:    Lumbar Spine  T-score -0.2 ., BMD is 1.156 g/cm2.  Left femoral neck  T-score -2.5   Right femoral neck  T-score -1.6   Left Total hip  T-score -2. , BMD is 0.693 g/cm2.  Right total hip  T-score -2, BMD is 0.755 g/cm2    Current daily calcium intake: 300 mg daily in supplements. Doesn't have much for dietary intake per patient.  Current vitamin D intake: 6000 units daily in supplements    Polymyalgia rheumatica:  Gabapentin 200 mg daily at bedtime  Acetaminophen 1000 mg once daily hasn't taken it in past few days.    Reports that mornings are lousy and she gets sick to her stomach from the pain sometimes. Has pain in her hands, shoulders, knees, neck. States she has never been this miserable in her whole life. Has trouble sleeping because of the pain.    CBC RESULTS:   Recent Labs   Lab Test 01/04/24  1144   WBC 11.5*   RBC 4.32   HGB 12.5   HCT 38.6   MCV 89   MCH 28.9   MCHC 32.4   RDW 16.2*        Last Comprehensive Metabolic Panel:  Lab Results   Component Value Date     11/17/2023    POTASSIUM 4.4 11/17/2023    CHLORIDE 104 11/17/2023    CO2 25 11/17/2023    ANIONGAP 13 11/17/2023    GLC 95 11/17/2023    BUN 24.5 (H) 11/17/2023    CR 1.44 (H) 01/04/2024    GFRESTIMATED 37 (L) 01/04/2024    TRACEY 9.6 11/17/2023     Liver Function Studies -   Recent Labs   Lab Test  01/04/24  1144 11/17/23  1430   PROTTOTAL  --  7.0   ALBUMIN  --  3.8   BILITOTAL  --  0.4   ALKPHOS  --  56   AST 27 28   ALT  --  6     Hypertension  Lisinopril 40 mg daily  Furosemide 20 mg once daily  Metoprolol succinate ER 25 mg daily    Patient reports no current medication side effects  Patient does not self-monitor blood pressure.       BP Readings from Last 3 Encounters:   12/28/23 108/58   12/01/23 123/60   11/21/23 98/56     Pulse Readings from Last 3 Encounters:   11/21/23 56   11/17/23 68      Hyperlipidemia:   Pravastatin 80 mg daily    Patient reports no current medication side effects.    Recent Labs   Lab Test 05/24/23  0945 11/16/22  1136   CHOL 149 143   HDL 56 52   LDL 72 73   TRIG 106 91      Vaccines:  Immunization History   Administered Date(s) Administered     COVID-19 Bivalent 12+ (Pfizer) 11/16/2022     COVID-19 MONOVALENT 12+ (Pfizer) 03/03/2021, 03/24/2021, 01/31/2022     Declines flu - previously received, but got sick from them.    Supplements:  Vitamin D3 5000 units daily  Vitamin B12 1000 mcg daily  Glucosamine 1500 mg once daily  Potassium chloride ER 10 mEq daily  Multivitamin daily  Fish oil 1200 mg daily    No reported issues at this time.     Today's Vitals: There were no vitals taken for this visit.  ----------------    I spent 44 minutes with this patient today. All changes were made via collaborative practice agreement with Javid Rodriguez A copy of the visit note was provided to the patient's provider(s).    A summary of these recommendations was mailed to the patient.    Joseph Cardoso, PharmD  Medication Therapy Management Pharmacist  [facility]  Phone: [phone]     Telemedicine Visit Details  Type of service:  Telephone visit  Start Time: 2:00 PM  End Time: 2:44 PM     Medication Therapy Recommendations  Osteoporosis, idiopathic    Rationale: Untreated condition - Needs additional medication therapy - Indication   Recommendation: Start  Medication - Calcium 600+D 600-10 MG-MCG Tabs   Status: Accepted per CPA          Rationale: Untreated condition - Needs additional medication therapy - Indication   Recommendation: Start Medication - RISEdronate 35 MG tablet   Status: Contact Provider - Awaiting Response         Vaccine counseling    Rationale: Preventive therapy - Needs additional medication therapy - Indication   Recommendation: Provide Education   Status: Patient Agreed - Adherence/Education   Note: Recommend Prevnar 20, COVID and RSV                  Again, thank you for allowing me to participate in the care of your patient.      Sincerely,    Joseph Cardoso RPH

## 2024-01-10 NOTE — PATIENT INSTRUCTIONS
"Recommendations from today's MTM visit:                                                    MTM (medication therapy management) is a service provided by a clinical pharmacist designed to help you get the most of out of your medicines.   Today we reviewed what your medicines are for, how to know if they are working, that your medicines are safe and how to make your medicine regimen as easy as possible.      Start taking risedronate 35 mg once weekly. Take it 30 minutes before any food, drink, or other medications. Remain upright for at least 30 minutes after taking.  Start taking a calcium supplement and take one tablet twice daily. Aim for at least 1200 mg of calcium per day.  Consider receiving the following vaccinations:  Prevnar 20 (pneumococcal)  COVID  RSV    Follow-up: Return in 9 weeks (on 3/13/2024) for Follow up, with me, using a phone visit.    It was great speaking with you today.  I value your experience and would be very thankful for your time in providing feedback in our clinic survey. In the next few days, you may receive an email or text message from [employer] with a link to a survey related to your  clinical pharmacist.\"     To schedule another MTM appointment, please call the clinic directly or you may call the MTM scheduling line at [phone] or toll-free at [phone].     My Clinical Pharmacist's contact information:                                                      Please feel free to contact me with any questions or concerns you have.      Joseph Cardoso, PharmD  Medication Therapy Management Pharmacist  [facility]  Phone: [phone]    "

## 2024-01-10 NOTE — Clinical Note
Assessment:     Well adolescent  1  Encounter for child physical exam with abnormal findings        2  Body mass index, pediatric, less than 5th percentile for age        1  Exercise counseling        4  Nutritional counseling        5  Encounter for vision screening with abnormal findings  Ambulatory Referral to Ophthalmology      6  Chronic bilateral low back pain without sciatica  XR entire spine (scoliosis) 4-5 vw      7  Encounter for immunization  HPV VACCINE 9 VALENT IM           Plan:         1  Anticipatory guidance discussed  Specific topics reviewed: drugs, ETOH, and tobacco, importance of regular dental care, importance of regular exercise, importance of varied diet, minimize junk food and sex; STD and pregnancy prevention  Nutrition and Exercise Counseling: The patient's Body mass index is 16 74 kg/m²  This is 2 %ile (Z= -2 05) based on CDC (Girls, 2-20 Years) BMI-for-age based on BMI available as of 4/4/2023  Nutrition counseling provided:  Avoid juice/sugary drinks  5 servings of fruits/vegetables  Exercise counseling provided:  Reduce screen time to less than 2 hours per day  1 hour of aerobic exercise daily  Depression Screening and Follow-up Plan:     Depression screening was negative with PHQ-A score of 5  Patient does not have thoughts of ending their life in the past month  Patient has not attempted suicide in their lifetime  2  Development: appropriate for age    1  Immunizations today: per orders  Discussed with: mother  The benefits, contraindication and side effects for the following vaccines were reviewed: Gardisil  Total number of components reveiwed: 1    4  Follow-up visit in 1 year for next well child visit, or sooner as needed  5   Abnormal vision screening: Abnormal vision screen during the office visit  Will refer to ophthalmology for evaluation  6   Chronic bilateral low back pain: She has lumbar spine deviation which could be scoliosis    We I sent an order with co-sign for you to look at for risedronate. Her renal function has a CrCl of 30.3 mL/min and it's not recommended to use alendronate if >35 mL/min. Risedronate is ok to use down to 30 mL/min.  Joseph will order an x-ray to rule this out  If it is present management of the condition pending severity of scoliosis  7   BMI below 5th percentile: We will need to continue monitoring her weight but from prior weights and growth charts in her chart it seems that she has always been either below or close to the 5th percentile for BMI  We discussed a healthy diet  Subjective:     Lucy Hua is a 17 y o  female who is here for this well-child visit  Current Issues:  Current concerns include back pain  Back Pain  This is a recurrent problem  Episode onset: 1-2 years ago  Episode frequency: Occurs once every 2 weeks  The problem is unchanged  The pain is present in the lumbar spine  The pain does not radiate  Stiffness is present: States she has some stiffness in the morning that goes away in a few seconds to minutes  Pertinent negatives include no bladder incontinence, bowel incontinence, leg pain, numbness, paresis, paresthesias, perianal numbness or tingling  regular periods, no issues, not sexually active  She is in a relationship  She states that if she were to become sexually active she would like to start an oral contraceptive prior to the sexual relationship  She also knows that her partner has to use a condom as well  As for her weight and nutrition she enjoys a varied diet but states that she has a poor appetite  She is not trying to lose weight  She denies restricting her diet  She states she exercises but does not do so in an excessive manner  Well Child Assessment:  History was provided by the mother  Ruchi Ybarra lives with her mother  Nutrition  Types of intake include cereals, cow's milk, juices, fruits, meats, vegetables and junk food  Dental  The patient has a dental home  The patient brushes teeth regularly  The patient does not floss regularly  Last dental exam was 6-12 months ago     Elimination  Elimination problems do not include constipation, diarrhea or urinary "symptoms  Behavioral  Behavioral issues do not include performing poorly at school  Sleep  There are no sleep problems  Safety  There is no smoking in the home  Home has working smoke alarms? yes  Home has working carbon monoxide alarms? yes  School  Current grade level is 11th (She would like to go into cosmetology but is not sure)  Child is doing well in school  Objective:       Vitals:    04/04/23 1307   BP: (!) 94/58   BP Location: Left arm   Patient Position: Sitting   Cuff Size: Standard   Pulse: 86   Resp: (!) 21   Temp: 97 7 °F (36 5 °C)   TempSrc: Temporal   SpO2: 99%   Weight: 42 9 kg (94 lb 8 oz)   Height: 5' 3\" (1 6 m)     Growth parameters are noted and are not appropriate for age  Wt Readings from Last 1 Encounters:   04/04/23 42 9 kg (94 lb 8 oz) (2 %, Z= -2 09)*     * Growth percentiles are based on [facility] (Girls, 2-20 Years) data  Ht Readings from Last 1 Encounters:   04/04/23 5' 3\" (1 6 m) (32 %, Z= -0 46)*     * Growth percentiles are based on CDC (Girls, 2-20 Years) data  Body mass index is 16 74 kg/m²  Vitals:    04/04/23 1307   BP: (!) 94/58   BP Location: Left arm   Patient Position: Sitting   Cuff Size: Standard   Pulse: 86   Resp: (!) 21   Temp: 97 7 °F (36 5 °C)   TempSrc: Temporal   SpO2: 99%   Weight: 42 9 kg (94 lb 8 oz)   Height: 5' 3\" (1 6 m)       Hearing Screening    125Hz 250Hz 500Hz 1000Hz 2000Hz 3000Hz 4000Hz 5000Hz 6000Hz 8000Hz   Right ear 30 30 20 20 20 20 20 20 20 20   Left ear 20 20 20 20 20 20 20 20 20 20     Vision Screening    Right eye Left eye Both eyes   Without correction 20/100 20/40 20/40   With correction          Physical Exam  Vitals reviewed  Constitutional:       General: She is awake  She is not in acute distress  Appearance: Normal appearance  HENT:      Head: Normocephalic  Right Ear: Tympanic membrane and ear canal normal       Left Ear: Tympanic membrane and ear canal normal       Mouth/Throat:      Lips: Pink   " Mouth: Mucous membranes are moist    Neck:      Thyroid: No thyroid mass, thyromegaly or thyroid tenderness  Cardiovascular:      Rate and Rhythm: Normal rate and regular rhythm  Heart sounds: Normal heart sounds, S1 normal and S2 normal    Pulmonary:      Effort: Pulmonary effort is normal       Breath sounds: Normal breath sounds and air entry  No decreased breath sounds, wheezing, rhonchi or rales  Abdominal:      General: Abdomen is flat  Bowel sounds are normal       Palpations: Abdomen is soft  Tenderness: There is no abdominal tenderness  There is no guarding or rebound  Musculoskeletal:      Cervical back: Normal range of motion and neck supple  Lumbar back: Deformity (lumbar scoliosis deviation noted) present  No swelling, edema, spasms, tenderness or bony tenderness  Normal range of motion  Lymphadenopathy:      Cervical: No cervical adenopathy  Right cervical: No superficial or posterior cervical adenopathy  Left cervical: No superficial or posterior cervical adenopathy  Neurological:      Mental Status: She is alert and easily aroused  Psychiatric:         Behavior: Behavior is cooperative

## 2024-01-11 RX ORDER — PREDNISONE 10 MG/1
10 TABLET ORAL DAILY
Qty: 30 TABLET | Refills: 0 | Status: SHIPPED | OUTPATIENT
Start: 2024-01-11 | End: 2024-01-17

## 2024-01-11 NOTE — TELEPHONE ENCOUNTER
I called Chelsea on the phone, She unfortunately missed her appointment on 1/9/2024.    She mentioned that she has ran out of her prednisone prescription few weeks ago (although prescription should cover her till 1/10/2024).     She has been having shoulder and hip stiffness as well as pain in her hands, She has right sided ear pain and jaw pain but she has been having runny nose that she attributes to her recent pentamidine inhalation. She denies headaches, new vision changes. She denies fever or sick contact.     I recommended she restart prednisone 20 mg daily and keep her appointment on 1/30/2024. I also educated her about warning symptoms including new vision changes that would warrant evaluation in the emergency room to rule out giant cell arteritis, she verbalized understanding and she is in agreement with this plan.    Javid Rodriguez MD

## 2024-01-15 NOTE — TELEPHONE ENCOUNTER
Called pt and spoke with her daughter. She states that her brother Kathy is the one who handles making appts and he will be calling to make the appt for US. She states that Chelsea was going to do it on Friday but she was not feeling well enough to go.  I will postpone this message a few days from now to see if an appt has been made, if not I will reach out to kathy to see how I can assist with making this appt.

## 2024-01-17 ENCOUNTER — APPOINTMENT (OUTPATIENT)
Dept: ULTRASOUND IMAGING | Facility: CLINIC | Age: 79
End: 2024-01-17
Payer: COMMERCIAL

## 2024-01-17 ENCOUNTER — HOSPITAL ENCOUNTER (OUTPATIENT)
Facility: CLINIC | Age: 79
Setting detail: OBSERVATION
Discharge: NURSING, REHAB OR LONG-TERM CARE | End: 2024-01-21
Attending: EMERGENCY MEDICINE | Admitting: STUDENT IN AN ORGANIZED HEALTH CARE EDUCATION/TRAINING PROGRAM
Payer: COMMERCIAL

## 2024-01-17 ENCOUNTER — APPOINTMENT (OUTPATIENT)
Dept: CT IMAGING | Facility: CLINIC | Age: 79
End: 2024-01-17
Payer: COMMERCIAL

## 2024-01-17 DIAGNOSIS — C64.9 MALIGNANT NEOPLASM OF KIDNEY, UNSPECIFIED LATERALITY (H): ICD-10-CM

## 2024-01-17 DIAGNOSIS — R91.1 RIGHT LOWER LOBE PULMONARY NODULE: ICD-10-CM

## 2024-01-17 DIAGNOSIS — K59.03 DRUG-INDUCED CONSTIPATION: ICD-10-CM

## 2024-01-17 DIAGNOSIS — L89.153 PRESSURE INJURY OF COCCYGEAL REGION, STAGE 3 (H): ICD-10-CM

## 2024-01-17 DIAGNOSIS — N39.0 ACUTE UTI: ICD-10-CM

## 2024-01-17 DIAGNOSIS — R11.2 NAUSEA AND VOMITING, UNSPECIFIED VOMITING TYPE: ICD-10-CM

## 2024-01-17 DIAGNOSIS — I26.99 BILATERAL PULMONARY EMBOLISM (H): Primary | ICD-10-CM

## 2024-01-17 PROBLEM — M85.80 OSTEOPENIA: Status: ACTIVE | Noted: 2024-01-17

## 2024-01-17 PROBLEM — I10 BENIGN HYPERTENSION: Status: ACTIVE | Noted: 2024-01-17

## 2024-01-17 PROBLEM — I12.9 HYPERTENSIVE CHRONIC KIDNEY DISEASE WITH STAGE 1 THROUGH STAGE 4 CHRONIC KIDNEY DISEASE, OR UNSPECIFIED CHRONIC KIDNEY DISEASE: Status: ACTIVE | Noted: 2024-01-17

## 2024-01-17 PROBLEM — E78.5 HYPERLIPIDEMIA: Status: ACTIVE | Noted: 2024-01-17

## 2024-01-17 LAB
ALBUMIN SERPL BCG-MCNC: 3 G/DL (ref 3.5–5.2)
ALBUMIN UR-MCNC: 20 MG/DL
ALP SERPL-CCNC: 56 U/L (ref 40–150)
ALT SERPL W P-5'-P-CCNC: 7 U/L (ref 0–50)
ANION GAP SERPL CALCULATED.3IONS-SCNC: 9 MMOL/L (ref 7–15)
APPEARANCE UR: ABNORMAL
AST SERPL W P-5'-P-CCNC: 30 U/L (ref 0–45)
ATRIAL RATE - MUSE: 59 BPM
BASOPHILS # BLD AUTO: 0 10E3/UL (ref 0–0.2)
BASOPHILS NFR BLD AUTO: 0 %
BILIRUB DIRECT SERPL-MCNC: 0.36 MG/DL (ref 0–0.3)
BILIRUB SERPL-MCNC: 0.9 MG/DL
BILIRUB UR QL STRIP: NEGATIVE
BUN SERPL-MCNC: 32.5 MG/DL (ref 8–23)
CALCIUM SERPL-MCNC: 9 MG/DL (ref 8.8–10.2)
CAOX CRY #/AREA URNS HPF: ABNORMAL /HPF
CHLORIDE SERPL-SCNC: 106 MMOL/L (ref 98–107)
COLOR UR AUTO: YELLOW
CREAT SERPL-MCNC: 1.14 MG/DL (ref 0.51–0.95)
DEPRECATED HCO3 PLAS-SCNC: 25 MMOL/L (ref 22–29)
DIASTOLIC BLOOD PRESSURE - MUSE: 58 MMHG
EGFRCR SERPLBLD CKD-EPI 2021: 49 ML/MIN/1.73M2
EOSINOPHIL # BLD AUTO: 0 10E3/UL (ref 0–0.7)
EOSINOPHIL NFR BLD AUTO: 0 %
ERYTHROCYTE [DISTWIDTH] IN BLOOD BY AUTOMATED COUNT: 15.9 % (ref 10–15)
ERYTHROCYTE [DISTWIDTH] IN BLOOD BY AUTOMATED COUNT: 16 % (ref 10–15)
GLUCOSE SERPL-MCNC: 95 MG/DL (ref 70–99)
GLUCOSE UR STRIP-MCNC: NEGATIVE MG/DL
HCT VFR BLD AUTO: 34.3 % (ref 35–47)
HCT VFR BLD AUTO: 40.9 % (ref 35–47)
HGB BLD-MCNC: 11.3 G/DL (ref 11.7–15.7)
HGB BLD-MCNC: 13.6 G/DL (ref 11.7–15.7)
HGB UR QL STRIP: NEGATIVE
HOLD SPECIMEN: NORMAL
HYALINE CASTS: 15 /LPF
IMM GRANULOCYTES # BLD: 0.1 10E3/UL
IMM GRANULOCYTES NFR BLD: 1 %
INTERPRETATION ECG - MUSE: NORMAL
KETONES UR STRIP-MCNC: NEGATIVE MG/DL
LEUKOCYTE ESTERASE UR QL STRIP: ABNORMAL
LIPASE SERPL-CCNC: 57 U/L (ref 13–60)
LYMPHOCYTES # BLD AUTO: 1.4 10E3/UL (ref 0.8–5.3)
LYMPHOCYTES NFR BLD AUTO: 13 %
MAGNESIUM SERPL-MCNC: 2.2 MG/DL (ref 1.7–2.3)
MCH RBC QN AUTO: 28.9 PG (ref 26.5–33)
MCH RBC QN AUTO: 29.2 PG (ref 26.5–33)
MCHC RBC AUTO-ENTMCNC: 32.9 G/DL (ref 31.5–36.5)
MCHC RBC AUTO-ENTMCNC: 33.3 G/DL (ref 31.5–36.5)
MCV RBC AUTO: 88 FL (ref 78–100)
MCV RBC AUTO: 88 FL (ref 78–100)
MONOCYTES # BLD AUTO: 0.7 10E3/UL (ref 0–1.3)
MONOCYTES NFR BLD AUTO: 7 %
MUCOUS THREADS #/AREA URNS LPF: PRESENT /LPF
NEUTROPHILS # BLD AUTO: 8.6 10E3/UL (ref 1.6–8.3)
NEUTROPHILS NFR BLD AUTO: 79 %
NITRATE UR QL: NEGATIVE
NRBC # BLD AUTO: 0 10E3/UL
NRBC BLD AUTO-RTO: 0 /100
NT-PROBNP SERPL-MCNC: 1016 PG/ML (ref 0–1800)
P AXIS - MUSE: 69 DEGREES
PH UR STRIP: 5 [PH] (ref 5–7)
PLATELET # BLD AUTO: 193 10E3/UL (ref 150–450)
PLATELET # BLD AUTO: 254 10E3/UL (ref 150–450)
POTASSIUM SERPL-SCNC: 4.3 MMOL/L (ref 3.4–5.3)
PR INTERVAL - MUSE: 138 MS
PROT SERPL-MCNC: 5.8 G/DL (ref 6.4–8.3)
QRS DURATION - MUSE: 86 MS
QT - MUSE: 432 MS
QTC - MUSE: 427 MS
R AXIS - MUSE: 55 DEGREES
RBC # BLD AUTO: 3.91 10E6/UL (ref 3.8–5.2)
RBC # BLD AUTO: 4.66 10E6/UL (ref 3.8–5.2)
RBC URINE: 3 /HPF
SODIUM SERPL-SCNC: 140 MMOL/L (ref 135–145)
SP GR UR STRIP: 1.03 (ref 1–1.03)
SQUAMOUS EPITHELIAL: 3 /HPF
SYSTOLIC BLOOD PRESSURE - MUSE: 125 MMHG
T AXIS - MUSE: 52 DEGREES
TROPONIN T SERPL HS-MCNC: 18 NG/L
TROPONIN T SERPL HS-MCNC: 19 NG/L
UROBILINOGEN UR STRIP-MCNC: 2 MG/DL
VENTRICULAR RATE- MUSE: 59 BPM
WBC # BLD AUTO: 10 10E3/UL (ref 4–11)
WBC # BLD AUTO: 10.9 10E3/UL (ref 4–11)
WBC URINE: 54 /HPF

## 2024-01-17 PROCEDURE — 250N000011 HC RX IP 250 OP 636: Performed by: EMERGENCY MEDICINE

## 2024-01-17 PROCEDURE — 83880 ASSAY OF NATRIURETIC PEPTIDE: CPT

## 2024-01-17 PROCEDURE — 99291 CRITICAL CARE FIRST HOUR: CPT | Mod: 25

## 2024-01-17 PROCEDURE — 36415 COLL VENOUS BLD VENIPUNCTURE: CPT

## 2024-01-17 PROCEDURE — 93970 EXTREMITY STUDY: CPT

## 2024-01-17 PROCEDURE — 71260 CT THORAX DX C+: CPT

## 2024-01-17 PROCEDURE — 93005 ELECTROCARDIOGRAM TRACING: CPT

## 2024-01-17 PROCEDURE — 85048 AUTOMATED LEUKOCYTE COUNT: CPT

## 2024-01-17 PROCEDURE — 250N000009 HC RX 250

## 2024-01-17 PROCEDURE — 258N000003 HC RX IP 258 OP 636

## 2024-01-17 PROCEDURE — 250N000013 HC RX MED GY IP 250 OP 250 PS 637

## 2024-01-17 PROCEDURE — 250N000011 HC RX IP 250 OP 636

## 2024-01-17 PROCEDURE — 87186 SC STD MICRODIL/AGAR DIL: CPT

## 2024-01-17 PROCEDURE — 96366 THER/PROPH/DIAG IV INF ADDON: CPT

## 2024-01-17 PROCEDURE — 81001 URINALYSIS AUTO W/SCOPE: CPT

## 2024-01-17 PROCEDURE — 96365 THER/PROPH/DIAG IV INF INIT: CPT | Mod: 59

## 2024-01-17 PROCEDURE — 80053 COMPREHEN METABOLIC PANEL: CPT

## 2024-01-17 PROCEDURE — 83690 ASSAY OF LIPASE: CPT

## 2024-01-17 PROCEDURE — 85027 COMPLETE CBC AUTOMATED: CPT | Mod: 91 | Performed by: EMERGENCY MEDICINE

## 2024-01-17 PROCEDURE — G0378 HOSPITAL OBSERVATION PER HR: HCPCS

## 2024-01-17 PROCEDURE — 87086 URINE CULTURE/COLONY COUNT: CPT

## 2024-01-17 PROCEDURE — 84484 ASSAY OF TROPONIN QUANT: CPT

## 2024-01-17 PROCEDURE — 96368 THER/DIAG CONCURRENT INF: CPT

## 2024-01-17 PROCEDURE — 96375 TX/PRO/DX INJ NEW DRUG ADDON: CPT

## 2024-01-17 PROCEDURE — 83735 ASSAY OF MAGNESIUM: CPT

## 2024-01-17 PROCEDURE — 36415 COLL VENOUS BLD VENIPUNCTURE: CPT | Performed by: EMERGENCY MEDICINE

## 2024-01-17 RX ORDER — FUROSEMIDE 20 MG
20 TABLET ORAL DAILY
Status: DISCONTINUED | OUTPATIENT
Start: 2024-01-18 | End: 2024-01-20

## 2024-01-17 RX ORDER — CEFDINIR 300 MG/1
300 CAPSULE ORAL 2 TIMES DAILY
Qty: 14 CAPSULE | Refills: 0 | Status: SHIPPED | OUTPATIENT
Start: 2024-01-17 | End: 2024-01-21

## 2024-01-17 RX ORDER — CEFTRIAXONE 1 G/1
1 INJECTION, POWDER, FOR SOLUTION INTRAMUSCULAR; INTRAVENOUS EVERY 24 HOURS
Status: DISCONTINUED | OUTPATIENT
Start: 2024-01-18 | End: 2024-01-18

## 2024-01-17 RX ORDER — ACETAMINOPHEN 325 MG/1
650 TABLET ORAL EVERY 4 HOURS PRN
Status: DISCONTINUED | OUTPATIENT
Start: 2024-01-17 | End: 2024-01-21 | Stop reason: HOSPADM

## 2024-01-17 RX ORDER — FAMOTIDINE 20 MG/1
20 TABLET, FILM COATED ORAL 2 TIMES DAILY
Qty: 30 TABLET | Refills: 0 | Status: CANCELLED | OUTPATIENT
Start: 2024-01-17

## 2024-01-17 RX ORDER — ACETAMINOPHEN 650 MG/1
650 SUPPOSITORY RECTAL EVERY 4 HOURS PRN
Status: DISCONTINUED | OUTPATIENT
Start: 2024-01-17 | End: 2024-01-21 | Stop reason: HOSPADM

## 2024-01-17 RX ORDER — LISINOPRIL 40 MG/1
40 TABLET ORAL DAILY
Status: DISCONTINUED | OUTPATIENT
Start: 2024-01-18 | End: 2024-01-21 | Stop reason: HOSPADM

## 2024-01-17 RX ORDER — PRAVASTATIN SODIUM 20 MG
80 TABLET ORAL AT BEDTIME
Status: DISCONTINUED | OUTPATIENT
Start: 2024-01-17 | End: 2024-01-21 | Stop reason: HOSPADM

## 2024-01-17 RX ORDER — ONDANSETRON 4 MG/1
4 TABLET, ORALLY DISINTEGRATING ORAL EVERY 6 HOURS PRN
Status: DISCONTINUED | OUTPATIENT
Start: 2024-01-17 | End: 2024-01-21 | Stop reason: HOSPADM

## 2024-01-17 RX ORDER — ONDANSETRON 2 MG/ML
4 INJECTION INTRAMUSCULAR; INTRAVENOUS ONCE
Status: COMPLETED | OUTPATIENT
Start: 2024-01-17 | End: 2024-01-17

## 2024-01-17 RX ORDER — BRIMONIDINE TARTRATE 2 MG/ML
1 SOLUTION/ DROPS OPHTHALMIC 2 TIMES DAILY
Status: DISCONTINUED | OUTPATIENT
Start: 2024-01-17 | End: 2024-01-21 | Stop reason: HOSPADM

## 2024-01-17 RX ORDER — AMOXICILLIN 250 MG
2 CAPSULE ORAL 2 TIMES DAILY PRN
Status: DISCONTINUED | OUTPATIENT
Start: 2024-01-17 | End: 2024-01-21 | Stop reason: HOSPADM

## 2024-01-17 RX ORDER — PREDNISONE 20 MG/1
20 TABLET ORAL DAILY
Status: DISCONTINUED | OUTPATIENT
Start: 2024-01-18 | End: 2024-01-21 | Stop reason: HOSPADM

## 2024-01-17 RX ORDER — IOPAMIDOL 755 MG/ML
75 INJECTION, SOLUTION INTRAVASCULAR ONCE
Status: COMPLETED | OUTPATIENT
Start: 2024-01-17 | End: 2024-01-17

## 2024-01-17 RX ORDER — BRIMONIDINE TARTRATE 2 MG/ML
1 SOLUTION/ DROPS OPHTHALMIC 2 TIMES DAILY
COMMUNITY

## 2024-01-17 RX ORDER — ONDANSETRON 4 MG/1
4 TABLET, ORALLY DISINTEGRATING ORAL EVERY 8 HOURS PRN
Qty: 10 TABLET | Refills: 0 | Status: CANCELLED | OUTPATIENT
Start: 2024-01-17 | End: 2024-01-20

## 2024-01-17 RX ORDER — ONDANSETRON 2 MG/ML
4 INJECTION INTRAMUSCULAR; INTRAVENOUS EVERY 6 HOURS PRN
Status: DISCONTINUED | OUTPATIENT
Start: 2024-01-17 | End: 2024-01-21 | Stop reason: HOSPADM

## 2024-01-17 RX ORDER — HEPARIN SODIUM 10000 [USP'U]/100ML
0-5000 INJECTION, SOLUTION INTRAVENOUS CONTINUOUS
Status: DISCONTINUED | OUTPATIENT
Start: 2024-01-17 | End: 2024-01-18

## 2024-01-17 RX ORDER — MAGNESIUM HYDROXIDE/ALUMINUM HYDROXICE/SIMETHICONE 120; 1200; 1200 MG/30ML; MG/30ML; MG/30ML
15 SUSPENSION ORAL ONCE
Status: COMPLETED | OUTPATIENT
Start: 2024-01-17 | End: 2024-01-17

## 2024-01-17 RX ORDER — AMOXICILLIN 250 MG
1 CAPSULE ORAL 2 TIMES DAILY PRN
Status: DISCONTINUED | OUTPATIENT
Start: 2024-01-17 | End: 2024-01-21 | Stop reason: HOSPADM

## 2024-01-17 RX ORDER — METOPROLOL SUCCINATE 25 MG/1
25 TABLET, EXTENDED RELEASE ORAL DAILY
Status: DISCONTINUED | OUTPATIENT
Start: 2024-01-18 | End: 2024-01-20

## 2024-01-17 RX ORDER — CEFTRIAXONE 1 G/1
1 INJECTION, POWDER, FOR SOLUTION INTRAMUSCULAR; INTRAVENOUS ONCE
Status: COMPLETED | OUTPATIENT
Start: 2024-01-17 | End: 2024-01-18

## 2024-01-17 RX ADMIN — CEFTRIAXONE 1 G: 1 INJECTION, POWDER, FOR SOLUTION INTRAMUSCULAR; INTRAVENOUS at 19:28

## 2024-01-17 RX ADMIN — ALUMINUM HYDROXIDE, MAGNESIUM HYDROXIDE, AND SIMETHICONE 15 ML: 200; 200; 20 SUSPENSION ORAL at 15:08

## 2024-01-17 RX ADMIN — PRAVASTATIN SODIUM 80 MG: 20 TABLET ORAL at 21:00

## 2024-01-17 RX ADMIN — ONDANSETRON 4 MG: 2 INJECTION INTRAMUSCULAR; INTRAVENOUS at 15:06

## 2024-01-17 RX ADMIN — HEPARIN SODIUM 1100 UNITS/HR: 10000 INJECTION, SOLUTION INTRAVENOUS at 19:36

## 2024-01-17 RX ADMIN — IOPAMIDOL 75 ML: 755 INJECTION, SOLUTION INTRAVENOUS at 17:27

## 2024-01-17 RX ADMIN — Medication 1 MG: at 23:32

## 2024-01-17 RX ADMIN — FAMOTIDINE 20 MG: 10 INJECTION, SOLUTION INTRAVENOUS at 15:03

## 2024-01-17 RX ADMIN — BRIMONIDINE TARTRATE 1 DROP: 2 SOLUTION/ DROPS OPHTHALMIC at 21:01

## 2024-01-17 RX ADMIN — SODIUM CHLORIDE 500 ML: 9 INJECTION, SOLUTION INTRAVENOUS at 15:00

## 2024-01-17 ASSESSMENT — ACTIVITIES OF DAILY LIVING (ADL)
ADLS_ACUITY_SCORE: 35
DEPENDENT_IADLS:: INDEPENDENT;TRANSPORTATION
ADLS_ACUITY_SCORE: 35
ADLS_ACUITY_SCORE: 37
ADLS_ACUITY_SCORE: 33
ADLS_ACUITY_SCORE: 35
ADLS_ACUITY_SCORE: 37

## 2024-01-17 NOTE — ED PROVIDER NOTES
I am seeing this patient along with Ester Cameron PA-C. I had a face to face encounter with this patient seen by the Advanced Practice Provider (SHELDON).  I have seen, examined, and discussed the patient with the SHELDON and agree with their assessment and plan of management. I personally saw the patient and performed a substantive portion of the visit including all aspects of the medical decision making.    6:33 PM Spoke to Hospitalist, regarding plan for admission. Patient was admitted.    HPI: The patient reports constant nausea for the past four weeks and states that she is only able to tolerate fluids. Also reports phlegm production for the past few weeks. She notes that she has an ulcer on her tailbone that has not healed. No other concerns at this time.   Physical Exam: Nontoxic, no acute distress, normal vitals      LABS  Pertinent lab results reviewed in chart.  Labs Ordered and Resulted from Time of ED Arrival to Time of ED Departure   BASIC METABOLIC PANEL - Abnormal       Result Value    Sodium 140      Potassium 4.3      Chloride 106      Carbon Dioxide (CO2) 25      Anion Gap 9      Urea Nitrogen 32.5 (*)     Creatinine 1.14 (*)     GFR Estimate 49 (*)     Calcium 9.0      Glucose 95     ROUTINE UA WITH MICROSCOPIC REFLEX TO CULTURE - Abnormal    Color Urine Yellow      Appearance Urine Turbid (*)     Glucose Urine Negative      Bilirubin Urine Negative      Ketones Urine Negative      Specific Gravity Urine 1.027      Blood Urine Negative      pH Urine 5.0      Protein Albumin Urine 20 (*)     Urobilinogen Urine 2.0 (*)     Nitrite Urine Negative      Leukocyte Esterase Urine 250 Destiny/uL (*)     Mucus Urine Present (*)     Calcium Oxalate Crystals Urine Few (*)     RBC Urine 3 (*)     WBC Urine 54 (*)     Squamous Epithelials Urine 3 (*)     Hyaline Casts Urine 15 (*)    TROPONIN T, HIGH SENSITIVITY - Abnormal    Troponin T, High Sensitivity 18 (*)    HEPATIC FUNCTION PANEL - Abnormal    Protein Total  5.8 (*)     Albumin 3.0 (*)     Bilirubin Total 0.9      Alkaline Phosphatase 56      AST 30      ALT 7      Bilirubin Direct 0.36 (*)    CBC WITH PLATELETS AND DIFFERENTIAL - Abnormal    WBC Count 10.9      RBC Count 4.66      Hemoglobin 13.6      Hematocrit 40.9      MCV 88      MCH 29.2      MCHC 33.3      RDW 16.0 (*)     Platelet Count 254      % Neutrophils 79      % Lymphocytes 13      % Monocytes 7      % Eosinophils 0      % Basophils 0      % Immature Granulocytes 1      NRBCs per 100 WBC 0      Absolute Neutrophils 8.6 (*)     Absolute Lymphocytes 1.4      Absolute Monocytes 0.7      Absolute Eosinophils 0.0      Absolute Basophils 0.0      Absolute Immature Granulocytes 0.1      Absolute NRBCs 0.0     TROPONIN T, HIGH SENSITIVITY - Abnormal    Troponin T, High Sensitivity 19 (*)    MAGNESIUM - Normal    Magnesium 2.2     NT PROBNP INPATIENT - Normal    N terminal Pro BNP Inpatient 1,016     LIPASE - Normal    Lipase 57     CBC WITH PLATELETS   HEPARIN UNFRACTIONATED ANTI XA LEVEL   URINE CULTURE       EKG      RADIOLOGY  CT Chest/Abdomen/Pelvis w Contrast   Final Result   IMPRESSION:   1.  Acute-appearing bilateral pulmonary emboli. No pulmonary infarction or right heart strain.   2.  2.7 cm solid left kidney mass represents renal cell carcinoma until proven otherwise. 4 mm right lower lobe pulmonary nodule could represent a metastasis, but is nonspecific and benign etiologies are in the differential. Recommend urology    consultation when clinically feasible.       Discussed by telephone with Dr. Vásquez at 6:18 PM on 01/17/2024.           PROCEDURES   Critical Care  Performed by: Daniella Vásquez MD  Authorized by: Daniella Vásquez MD  Total critical care time: 45 minutes  Critical care time was exclusive of separately billable procedures and treating other patients.  Critical care was necessary to treat or prevent imminent or life-threatening deterioration of the following conditions: PE requiring  heparinization  Critical care was time spent personally by me on the following activities: development of treatment plan with patient or surrogate, discussions with consultants, examination of patient, evaluation of patient's response to treatment, obtaining history from patient or surrogate, ordering and performing treatments and interventions, ordering and review of laboratory studies, ordering and review of radiographic studies and re-evaluation of patient's condition, this excludes any separately billable procedures.        ED COURSE & MEDICAL DECISION MAKING    Pertinent Labs and Imagaing reviewed (see chart for details)    78 year old female here with nausea and vomiting, generalized weakness.  She is nontoxic here with normal vitals.  Workup initiated by SHELDON she does show what looks like a UTI.  A p.o. challenge here has been successful and initially her thought was to likely discharge home.  However, she complained of some lower abdominal discomfort so we added a CT scan of her chest, abdomen, and pelvis as she also noted that she has been having a cough.  Unfortunately was been discovered is that she actually has bilateral pulmonary emboli but fortunately with no right heart strain at this time.  Her EKG is nonischemic.  Unfortunately, it also looks like she has a new renal cancer and will likely need oncologic establishment of care.  Given age and the fact that she also has a UTI with vomiting, I have started heparinization and discussed the case with [facility] for admission.  She was given a dose of antibiotics here in the ED as well.    At the conclusion of the encounter I discussed  the results of all of the tests and the disposition.   The questions were answered.  The patient or family acknowledged understanding and was agreeable with the care plan.       FINAL IMPRESSION      1. Nausea and vomiting, unspecified vomiting type    2. Acute UTI    3. Bilateral pulmonary embolism  (H)    4. Malignant neoplasm of kidney, unspecified laterality (H)            CRITICAL CARE  40 Minutes      I, Tami Hayward, am serving as a scribe to document services personally performed by Daniella Vásquez MD, based on my observations and the provider's statements to me.  I, Daniella Vásquez MD, attest that Tami Hayward is acting in a scribe capacity, has observed my performance of the services and has documented them in accordance with my direction.     Daniella Vásquez MD  1/17/2024 2:44 PM     Daniella Vásquez MD  01/17/24 [id number]

## 2024-01-17 NOTE — ED PROVIDER NOTES
EMERGENCY DEPARTMENT ENCOUNTER      NAME: Chelsea Peacock  AGE: 78 year old female  YOB: 1945  MRN: [id number]  EVALUATION DATE & TIME: 01/17/24 5:12 PM    PCP: jC Broussard    ED PROVIDER: Ester Cameron PA-C      CHIEF COMPLAINT:  Failure to Thrive      FINAL IMPRESSION:  1. Nausea and vomiting, unspecified vomiting type    2. Acute UTI          ED COURSE & MEDICAL DECISION MAKING:  Pertinent Labs & Imaging studies reviewed. (See chart for details)    ED Course as of 01/17/24 1750 Wed Jan 17, 2024   1436 I met and introduced myself to the patient. I gathered initial history and performed an initial physical exam. We discussed options and plan for diagnostics and treatment here in the ED.   1450 The patient is a 78-year-old female with history of hypertension and polymyalgia rheumatica presenting to the emergency department accompanied by her 2 children for evaluation of failure to thrive.  The patient is experiencing nausea with nonbloody emesis for the past 3 days especially brought on by exertion.  She has not had anything to eat for the past 3 days but has been able to drink fluids.  She has had a slight productive cough recently.  No fevers.  No urinary symptoms or abdominal pain.  No diarrhea or constipation, melena, hematochezia.  She does have a chronic sacral wound for which she has recently been on a course of Keflex last month.  She does have wound care nurse that comes to her house to help with dressings.   1451 The patient does note that she has a history of acid reflux for which she does not take any medication for on a regular basis.   1452 Initial vital signs reviewed and within normal limits.  Patient is afebrile.  On exam, the patient is resting comfortably in exam bed in no acute distress.  Due to the ED boarding crisis, patient is seen in the hallway therefore did defer examining sacral wound per patient request.  She appears frail.  She is nontoxic-appearing.  Mucous  membranes are slightly dry.  Heart with regular rate and rhythm.  Abdomen is soft, nontender with no reproducible tenderness to palpation on my exam.  No rebound or guarding.  Bowel sounds are present.   1453 Differential diagnosis viral illness, gastritis, gastroenteritis, gastroesophageal reflux, peptic ulcer disease, atypical ACS, failure to thrive, atypical appendicitis, bowel obstruction/ileus, pancreatitis, cystitis, atypical cholecystitis, dehydration, heart failure, electrolyte abnormality, arrhythmia.  Low clinical suspicion for Boerhaave's, AAA, intracranial hemorrhage, meningitis, sepsis, ischemic bowel, peritonitis.   1454 Discussed options for initial workup and management here in the emergency department with patient and her children who are at the bedside, they are comfortable with plan for laboratory studies and imaging as well as antiemetic medications with p.o. challenge.   1554 Creatinine stable compared to prior.   1554 I have staffed the patient with Dr. Daniella Vásquez, ED physician, who has evaluated the patient and agrees with all aspects of today's care.    1604 CBC with no leukocytosis.  BMP with creatinine stable compared to prior.  Lipase, magnesium within normal limits.  Hepatic function panel with decreased protein and albumin and increased direct bilirubin.  BNP 1016.  Delta troponin reassuring.  Urinalysis is suggestive of infection.  Urine culture sent.  I updated the patient on her results thus far.  She reports that she does feel better with the GI cocktail, Zofran, Pepcid, and IV fluids but is declining p.o. challenge at this time.  Discussed trial of p.o. challenge after she returns from CT scan.  Patient is agreeable with that plan.   1738 Patient signed out to Dr. Daniella Vásquez at conclusion of my shift with CT results and p.o. challenge pending.  Ultimate disposition pending imaging results and passing p.o. challenge.       At the conclusion of the encounter I discussed the  results of all of the tests and the disposition. The questions were answered. The patient or family acknowledged understanding and was agreeable with the care plan.         History:  Supplemental history from: Documented in chart and Family Member/Significant Other  External Record(s) reviewed: Documented in chart and Inpatient Record: Emergency department visit from 11/17/2023 and outpatient visit on 12/29/23    Work Up:  Chart documentation includes differential considered and any EKGs or imaging independently interpreted by provider, where specified.  In additional to work up documented, I considered the following work up: Documented in chart, if applicable.    External consultation:  Discussion of management with another provider: Documented in chart, if applicable    Complicating factors:  Care impacted by chronic illness: Hypertension and Other: Polymyalgia rheumatica  Care affected by social determinants of health: N/A    Disposition considerations: Admission considered. Patient was signed out to the oncoming physician, disposition pending.          MEDICATIONS GIVEN IN THE EMERGENCY:  Medications   sodium chloride 0.9% BOLUS 500 mL (0 mLs Intravenous Stopped 1/17/24 1528)   famotidine (PEPCID) injection 20 mg (20 mg Intravenous $Given 1/17/24 1503)   alum & mag hydroxide-simethicone (MAALOX) suspension 15 mL (15 mLs Oral $Given 1/17/24 1508)   ondansetron (ZOFRAN) injection 4 mg (4 mg Intravenous $Given 1/17/24 1506)   iopamidol (ISOVUE-370) solution 75 mL (75 mLs Intravenous $Given 1/17/24 1727)       NEW PRESCRIPTIONS STARTED AT TODAY'S ER VISIT  New Prescriptions    No medications on file          =================================================================    HPI    Patient information was obtained from: The patient and her children are at the bedside    Use of Intrepreter: N/A         Chelsea CHAIREZ Madonna is a 78 year old female with pertinent medical history of polymyalgia rheumatica and hypertension who  presents to the emergency department for evaluation of failure to thrive.    Per chart review, the patient was seen in the emergency department on 11/17/2023 for joint pain.  Workup suspicious for polymyalgia rheumatica, patient was referred to rheumatology who did diagnosed with polymyalgia rheumatica with prednisone taper with prescription started on 11/21/2023.  Per chart review, patient was started on course of Keflex on 12/29/2023 by her primary doctor for sacral wound.    The patient reports that she has experienced nausea and nonbloody emesis she also has had a mild cough with intermittent mild sputum production, cough is not provoked by ambulation.  No chest pain, abdominal pain, urinary symptoms, diarrhea, hemoptysis.  No fevers.  She tested negative for COVID-19 at home on Friday.  She has not been eating food with the decreased appetite for the past few days but has been drinking water.  She also has a chronic nonhealing wound to her sacrum for which a wound care nurse comes to her house to change her dressing.  She was on a course of Keflex for the wound and did take most of the course but has not taken the last 2 days of the course of Keflex due to nausea.        PAST MEDICAL HISTORY:  No past medical history on file.    PAST SURGICAL HISTORY:  No past surgical history on file.    CURRENT MEDICATIONS:    Prior to Admission Medications   Prescriptions Last Dose Informant Patient Reported? Taking?   Omega-3 Fatty Acids (FISH OIL) 1200 MG capsule   Yes No   Sig: Take 1,200 mg by mouth daily   RISEdronate (ACTONEL) 35 MG tablet   No No   Sig: Take 1 tablet (35 mg) by mouth every 7 days Take 30 minutes before any food, drink, or other medications. Remain upright for 30 minutes after taking.   aspirin 81 mg chewable tablet   Yes No   Sig: [ASPIRIN 81 MG CHEWABLE TABLET] Chew 81 mg every other day.   calcium carbonate-vitamin D (OSCAL) 500-5 MG-MCG tablet   No No   Sig: Take 1 tablet by mouth 2 times daily    cholecalciferol, vitamin D3, 1,000 unit tablet   Yes No   Sig: [CHOLECALCIFEROL, VITAMIN D3, 1,000 UNIT TABLET] Take 2,000 Units by mouth daily.   cyanocobalamin 1000 MCG tablet   Yes No   Sig: [CYANOCOBALAMIN 1000 MCG TABLET] Take 2,500 mcg by mouth daily.   furosemide (LASIX) 20 MG tablet   Yes No   Sig: Take 20 mg by mouth daily   gabapentin (NEURONTIN) 100 MG capsule   Yes No   Sig: Take 200 mg by mouth daily   glucosamine sulfate (GLUCOSAMINE) 500 mg Tab   Yes No   Sig: [GLUCOSAMINE SULFATE (GLUCOSAMINE) 500 MG TAB] Take 1,500 mg by mouth daily.   lisinopril (PRINIVIL,ZESTRIL) 40 MG tablet   Yes No   Sig: [LISINOPRIL (PRINIVIL,ZESTRIL) 40 MG TABLET] Take 40 mg by mouth daily.   metoprolol succinate ER (TOPROL XL) 25 MG 24 hr tablet   Yes No   Sig: Take 25 mg by mouth daily   potassium chloride SA (K-DUR,KLOR-CON) 10 MEQ tablet   Yes No   Sig: [POTASSIUM CHLORIDE SA (K-DUR,KLOR-CON) 10 MEQ TABLET] Take 10 mEq by mouth daily.   pravastatin (PRAVACHOL) 80 MG tablet   Yes No   Sig: [PRAVASTATIN (PRAVACHOL) 80 MG TABLET] Take 80 mg by mouth bedtime.   predniSONE (DELTASONE) 10 MG tablet   No No   Sig: Take 1 tablet (10 mg) by mouth daily for 30 days   therapeutic multivitamin (THERAGRAN) tablet   Yes No   Sig: [THERAPEUTIC MULTIVITAMIN (THERAGRAN) TABLET] Take 1 tablet by mouth daily.      Facility-Administered Medications: None       ALLERGIES:  Allergies   Allergen Reactions    Cortisone Itching    Sulfa (Sulfonamide Antibiotics) [Sulfa Antibiotics] Itching       FAMILY HISTORY:  No family history on file.    SOCIAL HISTORY:  Social History     Tobacco Use    Smoking status: Former     Types: Cigarettes    Smokeless tobacco: Never   Vaping Use    Vaping Use: Never used        VITALS:    First Vitals:  Patient Vitals for the past 24 hrs:   BP Temp Temp src Pulse Resp SpO2 Height Weight   01/17/24 1708 100/51 -- -- 58 17 95 % -- --   01/17/24 1515 125/58 -- -- 56 -- 99 % -- --   01/17/24 1355 105/56 -- -- 64 -- 98  "% -- --   01/17/24 1330 110/57 -- -- 65 -- 97 % -- --   01/17/24 1315 111/55 -- -- 78 -- 97 % -- --   01/17/24 1307 130/55 -- -- 77 -- 98 % -- --   01/17/24 1219 107/55 97.4  F (36.3  C) Temporal 82 16 98 % 1.6 m (5' 3\") 61.2 kg (135 lb)       Patient Vitals for the past 24 hrs:   BP Temp Temp src Pulse Resp SpO2 Height Weight   01/17/24 1708 100/51 -- -- 58 17 95 % -- --   01/17/24 1515 125/58 -- -- 56 -- 99 % -- --   01/17/24 1355 105/56 -- -- 64 -- 98 % -- --   01/17/24 1330 110/57 -- -- 65 -- 97 % -- --   01/17/24 1315 111/55 -- -- 78 -- 97 % -- --   01/17/24 1307 130/55 -- -- 77 -- 98 % -- --   01/17/24 1219 107/55 97.4  F (36.3  C) Temporal 82 16 98 % 1.6 m (5' 3\") 61.2 kg (135 lb)         PHYSICAL EXAM  VITAL SIGNS: /51   Pulse 58   Temp 97.4  F (36.3  C) (Temporal)   Resp 17   Ht 1.6 m (5' 3\")   Wt 61.2 kg (135 lb)   SpO2 95%   BMI 23.91 kg/m     GENERAL: Awake, alert, answering questions appropriately, resting comfortably in hospital bed in no acute distress.  HEENT: Slightly dry mucous membranes. Posterior oropharynx clear with no erythema, no exudate. No tonsillar hypertrophy. Uvula midline. Tolerating secretions, no drooling.    SPEECH:  Easy to understand speech, Normal volume and jeannine. Normal phonation.  PULMONARY: No respiratory distress, Breathing comfortably on room air. Lungs clear to auscultation bilaterally.  CARDIOVASCULAR: Regular rate and rhythm, radial pulses present, symmetric, and normal.  ABDOMINAL: Soft, Nondistended, suprapubic tenderness to palpation, No rebound or guarding, No palpable masses.  EXTREMITIES: Extremities are warm and well perfused. No lower extremity edema.  NEUROLOGIC: Moving all extremities spontaneously.   SKIN: Exposed areas of skin warm, dry, no rashes.  PSYCH: Normal mood and affect.         RADIOLOGY/LAB:  Reviewed all pertinent imaging. Please see official radiology report.  All pertinent labs reviewed and interpreted.  Results for orders placed " or performed during the hospital encounter of 01/17/24   Extra Blue Top Tube   Result Value Ref Range    Hold Specimen JIC    Extra Red Top Tube   Result Value Ref Range    Hold Specimen JIC    Extra Green Top (Lithium Heparin) Tube   Result Value Ref Range    Hold Specimen JIC    Extra Purple Top Tube   Result Value Ref Range    Hold Specimen JIC    Extra Heparinized Syringe   Result Value Ref Range    Hold Specimen     Basic metabolic panel   Result Value Ref Range    Sodium 140 135 - 145 mmol/L    Potassium 4.3 3.4 - 5.3 mmol/L    Chloride 106 98 - 107 mmol/L    Carbon Dioxide (CO2) 25 22 - 29 mmol/L    Anion Gap 9 7 - 15 mmol/L    Urea Nitrogen 32.5 (H) 8.0 - 23.0 mg/dL    Creatinine 1.14 (H) 0.51 - 0.95 mg/dL    GFR Estimate 49 (L) >60 mL/min/1.73m2    Calcium 9.0 8.8 - 10.2 mg/dL    Glucose 95 70 - 99 mg/dL   UA with Microscopic reflex to Culture    Specimen: Urine, NOS   Result Value Ref Range    Color Urine Yellow Colorless, Straw, Light Yellow, Yellow    Appearance Urine Turbid (A) Clear    Glucose Urine Negative Negative mg/dL    Bilirubin Urine Negative Negative    Ketones Urine Negative Negative mg/dL    Specific Gravity Urine 1.027 1.001 - 1.030    Blood Urine Negative Negative    pH Urine 5.0 5.0 - 7.0    Protein Albumin Urine 20 (A) Negative mg/dL    Urobilinogen Urine 2.0 (A) <2.0 mg/dL    Nitrite Urine Negative Negative    Leukocyte Esterase Urine 250 Destiny/uL (A) Negative    Mucus Urine Present (A) None Seen /LPF    Calcium Oxalate Crystals Urine Few (A) None Seen /HPF    RBC Urine 3 (H) <=2 /HPF    WBC Urine 54 (H) <=5 /HPF    Squamous Epithelials Urine 3 (H) <=1 /HPF    Hyaline Casts Urine 15 (H) <=2 /LPF   Result Value Ref Range    Troponin T, High Sensitivity 18 (H) <=14 ng/L   Result Value Ref Range    Magnesium 2.2 1.7 - 2.3 mg/dL   Nt probnp inpatient (BNP)   Result Value Ref Range    N terminal Pro BNP Inpatient 1,016 0 - 1,800 pg/mL   Hepatic function panel   Result Value Ref Range     Protein Total 5.8 (L) 6.4 - 8.3 g/dL    Albumin 3.0 (L) 3.5 - 5.2 g/dL    Bilirubin Total 0.9 <=1.2 mg/dL    Alkaline Phosphatase 56 40 - 150 U/L    AST 30 0 - 45 U/L    ALT 7 0 - 50 U/L    Bilirubin Direct 0.36 (H) 0.00 - 0.30 mg/dL   Result Value Ref Range    Lipase 57 13 - 60 U/L   CBC with platelets and differential   Result Value Ref Range    WBC Count 10.9 4.0 - 11.0 10e3/uL    RBC Count 4.66 3.80 - 5.20 10e6/uL    Hemoglobin 13.6 11.7 - 15.7 g/dL    Hematocrit 40.9 35.0 - 47.0 %    MCV 88 78 - 100 fL    MCH 29.2 26.5 - 33.0 pg    MCHC 33.3 31.5 - 36.5 g/dL    RDW 16.0 (H) 10.0 - 15.0 %    Platelet Count 254 150 - 450 10e3/uL    % Neutrophils 79 %    % Lymphocytes 13 %    % Monocytes 7 %    % Eosinophils 0 %    % Basophils 0 %    % Immature Granulocytes 1 %    NRBCs per 100 WBC 0 <1 /100    Absolute Neutrophils 8.6 (H) 1.6 - 8.3 10e3/uL    Absolute Lymphocytes 1.4 0.8 - 5.3 10e3/uL    Absolute Monocytes 0.7 0.0 - 1.3 10e3/uL    Absolute Eosinophils 0.0 0.0 - 0.7 10e3/uL    Absolute Basophils 0.0 0.0 - 0.2 10e3/uL    Absolute Immature Granulocytes 0.1 <=0.4 10e3/uL    Absolute NRBCs 0.0 10e3/uL   Troponin T, High Sensitivity (now)   Result Value Ref Range    Troponin T, High Sensitivity 19 (H) <=14 ng/L   ECG 12-LEAD WITH MUSE (LHE)   Result Value Ref Range    Systolic Blood Pressure 125 mmHg    Diastolic Blood Pressure 58 mmHg    Ventricular Rate 59 BPM    Atrial Rate 59 BPM    OH Interval 138 ms    QRS Duration 86 ms     ms    QTc 427 ms    P Axis 69 degrees    R AXIS 55 degrees    T Axis 52 degrees    Interpretation ECG       Sinus bradycardia  Otherwise normal ECG  No previous ECGs available  Confirmed by SEE ED PROVIDER NOTE FOR, ECG INTERPRETATION ([id number]),  Hermilo Youngblood ([id number]) on 1/17/2024 3:25:45 PM           EKG:  Performed at: 1521    Impression: Sinus bradycardia.  Otherwise normal ECG.  No previous ECGs available.    Rate: 59 bpm  Rhythm: Sinus bradycardia.  Axis: 69  55  52  OH  Interval: 138  QRS Interval: 86  QTc Interval: 427  ST Changes: No STEMI  Comparison: No previous ECGs available    EKG results independently reviewed and interpreted by Dr. Daniella Vásquez, ED physician.                     Ester Cameron PA-C  Emergency Medicine  [facility] EMERGENCY ROOM  [address] [facility] [address]  [phone]  Dept: [phone]     Ester Cameron PA-C  01/17/24 1738       Ester Cameron PA-C  01/17/24 1759

## 2024-01-17 NOTE — ED TRIAGE NOTES
Patient presents to ED with daughter, patient has only been drinking broth instead of eating for the past 3-4 weeks r/t constant nausea, patient has an ulcer on her tailbone that is not healing, patient has been having phlegm for weeks.  Niya Pettit RN.......1/17/2024 12:23 PM     Triage Assessment (Adult)       Row Name 01/17/24 1221          Triage Assessment    Airway WDL WDL        Respiratory WDL    Respiratory WDL WDL        Skin Circulation/Temperature WDL    Skin Circulation/Temperature WDL WDL        Cardiac WDL    Cardiac WDL WDL        Peripheral/Neurovascular WDL    Peripheral Neurovascular WDL WDL        Cognitive/Neuro/Behavioral WDL    Cognitive/Neuro/Behavioral WDL WDL

## 2024-01-17 NOTE — DISCHARGE INSTRUCTIONS
You were seen in the emergency department today for your symptoms.  Please take the full course of antibiotics as prescribed.  Please follow-up closely with your primary doctor for close recheck.  Return to the emergency department if you develop any new or worsening symptoms.    WOC DISCHARGE INSTRUCTIONS:  Coccyx  Soak wound with vashe moistened gauze for 2-5 minutes, pat dry  Cut aquacel ag to cover wound  Cover with mepilex dressing  Change MWF + PRN soiled, saturated, falls off

## 2024-01-18 ENCOUNTER — APPOINTMENT (OUTPATIENT)
Dept: PHYSICAL THERAPY | Facility: CLINIC | Age: 79
End: 2024-01-18
Payer: COMMERCIAL

## 2024-01-18 LAB
ANION GAP SERPL CALCULATED.3IONS-SCNC: 6 MMOL/L (ref 7–15)
BUN SERPL-MCNC: 30.3 MG/DL (ref 8–23)
CALCIUM SERPL-MCNC: 8.4 MG/DL (ref 8.8–10.2)
CHLORIDE SERPL-SCNC: 104 MMOL/L (ref 98–107)
CREAT SERPL-MCNC: 1.25 MG/DL (ref 0.51–0.95)
DEPRECATED HCO3 PLAS-SCNC: 25 MMOL/L (ref 22–29)
EGFRCR SERPLBLD CKD-EPI 2021: 44 ML/MIN/1.73M2
ERYTHROCYTE [DISTWIDTH] IN BLOOD BY AUTOMATED COUNT: 16 % (ref 10–15)
GLUCOSE SERPL-MCNC: 87 MG/DL (ref 70–99)
HCT VFR BLD AUTO: 32.5 % (ref 35–47)
HGB BLD-MCNC: 10.8 G/DL (ref 11.7–15.7)
MCH RBC QN AUTO: 29.3 PG (ref 26.5–33)
MCHC RBC AUTO-ENTMCNC: 33.2 G/DL (ref 31.5–36.5)
MCV RBC AUTO: 88 FL (ref 78–100)
PLATELET # BLD AUTO: 178 10E3/UL (ref 150–450)
POTASSIUM SERPL-SCNC: 4.5 MMOL/L (ref 3.4–5.3)
RBC # BLD AUTO: 3.69 10E6/UL (ref 3.8–5.2)
SODIUM SERPL-SCNC: 135 MMOL/L (ref 135–145)
UFH PPP CHRO-ACNC: 0.9 IU/ML
UFH PPP CHRO-ACNC: 0.98 IU/ML
UFH PPP CHRO-ACNC: >1.1 IU/ML
UFH PPP CHRO-ACNC: >1.1 IU/ML
WBC # BLD AUTO: 8.3 10E3/UL (ref 4–11)

## 2024-01-18 PROCEDURE — G0378 HOSPITAL OBSERVATION PER HR: HCPCS

## 2024-01-18 PROCEDURE — 85027 COMPLETE CBC AUTOMATED: CPT

## 2024-01-18 PROCEDURE — G0463 HOSPITAL OUTPT CLINIC VISIT: HCPCS

## 2024-01-18 PROCEDURE — 250N000013 HC RX MED GY IP 250 OP 250 PS 637

## 2024-01-18 PROCEDURE — 36415 COLL VENOUS BLD VENIPUNCTURE: CPT | Performed by: STUDENT IN AN ORGANIZED HEALTH CARE EDUCATION/TRAINING PROGRAM

## 2024-01-18 PROCEDURE — 85520 HEPARIN ASSAY: CPT | Mod: XU | Performed by: STUDENT IN AN ORGANIZED HEALTH CARE EDUCATION/TRAINING PROGRAM

## 2024-01-18 PROCEDURE — 80048 BASIC METABOLIC PNL TOTAL CA: CPT

## 2024-01-18 PROCEDURE — 99223 1ST HOSP IP/OBS HIGH 75: CPT | Mod: AI

## 2024-01-18 PROCEDURE — 97116 GAIT TRAINING THERAPY: CPT | Mod: GP

## 2024-01-18 PROCEDURE — 250N000012 HC RX MED GY IP 250 OP 636 PS 637

## 2024-01-18 PROCEDURE — 36415 COLL VENOUS BLD VENIPUNCTURE: CPT | Performed by: EMERGENCY MEDICINE

## 2024-01-18 PROCEDURE — 85520 HEPARIN ASSAY: CPT | Performed by: STUDENT IN AN ORGANIZED HEALTH CARE EDUCATION/TRAINING PROGRAM

## 2024-01-18 PROCEDURE — 85520 HEPARIN ASSAY: CPT | Performed by: EMERGENCY MEDICINE

## 2024-01-18 PROCEDURE — 97161 PT EVAL LOW COMPLEX 20 MIN: CPT | Mod: GP

## 2024-01-18 PROCEDURE — 250N000011 HC RX IP 250 OP 636: Performed by: EMERGENCY MEDICINE

## 2024-01-18 PROCEDURE — 36415 COLL VENOUS BLD VENIPUNCTURE: CPT

## 2024-01-18 PROCEDURE — 96366 THER/PROPH/DIAG IV INF ADDON: CPT

## 2024-01-18 RX ADMIN — Medication 1 MG: at 21:05

## 2024-01-18 RX ADMIN — ACETAMINOPHEN 650 MG: 325 TABLET ORAL at 13:05

## 2024-01-18 RX ADMIN — BRIMONIDINE TARTRATE 1 DROP: 2 SOLUTION/ DROPS OPHTHALMIC at 09:52

## 2024-01-18 RX ADMIN — RIVAROXABAN 15 MG: 15 TABLET, FILM COATED ORAL at 14:15

## 2024-01-18 RX ADMIN — ACETAMINOPHEN 650 MG: 325 TABLET ORAL at 18:04

## 2024-01-18 RX ADMIN — PRAVASTATIN SODIUM 80 MG: 20 TABLET ORAL at 21:05

## 2024-01-18 RX ADMIN — PREDNISONE 20 MG: 20 TABLET ORAL at 08:25

## 2024-01-18 RX ADMIN — LISINOPRIL 40 MG: 10 TABLET ORAL at 08:26

## 2024-01-18 RX ADMIN — OXYCODONE HYDROCHLORIDE 2.5 MG: 5 TABLET ORAL at 21:05

## 2024-01-18 RX ADMIN — BRIMONIDINE TARTRATE 1 DROP: 2 SOLUTION/ DROPS OPHTHALMIC at 21:05

## 2024-01-18 ASSESSMENT — ACTIVITIES OF DAILY LIVING (ADL)
ADLS_ACUITY_SCORE: 38
ADLS_ACUITY_SCORE: 38
ADLS_ACUITY_SCORE: 37

## 2024-01-18 NOTE — PROGRESS NOTES
"PRIMARY DIAGNOSIS: \"GENERIC\" NURSING  OUTPATIENT/OBSERVATION GOALS TO BE MET BEFORE DISCHARGE:  ADLs back to baseline: No    Activity and level of assistance: Up with maximum assistance. Consider SW and/or PT evaluation.     Pain status: Improved-controlled with oral pain medications.    Return to near baseline physical activity: No     Discharge Planner Nurse   Safe discharge environment identified: No  Barriers to discharge: Yes       Entered by: Justina Hernández RN 01/18/2024 3:03 AM     Please review provider order for any additional goals.   Nurse to notify provider when observation goals have been met and patient is ready for discharge.  "

## 2024-01-18 NOTE — PROGRESS NOTES
Care Management Follow Up    Length of Stay (days): 0    Expected Discharge Date: 01/19/24     Anticipated Discharge Disposition: Transitional Care     Additional Information:  Writer has identified TCU recommendations from Physical Therapy personnel. Pt was asked about preferences and stated to speak with her daughter Diane. Writer called and spoke with Diane who requested referrals be placed to the [facility] and [facility] - done. No other preferences provided at this time. CM to follow and continue to assist with service coordination.    Reynaldo Bright RN

## 2024-01-18 NOTE — PROGRESS NOTES
01/18/24 1300   Appointment Info   Signing Clinician's Name / Credentials (PT) Bhavin Harrison, PT   Quick Adds   Quick Adds Certification   Living Environment   People in Home alone   Current Living Arrangements house   Home Accessibility stairs to enter home;stairs within home   Number of Stairs, Main Entrance 4   Stair Railings, Main Entrance railings safe and in good condition   Number of Stairs, Within Home, Primary eight   Stair Railings, Within Home, Primary railings safe and in good condition   Transportation Anticipated family or friend will provide   Living Environment Comments can stay on one level per patient   Self-Care   Usual Activity Tolerance moderate   Current Activity Tolerance moderate   Equipment Currently Used at Home raised toilet seat;grab bar, tub/shower   Fall history within last six months no   Activity/Exercise/Self-Care Comment Indep with all I ADL's and ADL's, does not drive   General Information   Onset of Illness/Injury or Date of Surgery 01/17/24   Pertinent History of Current Problem (include personal factors and/or comorbidities that impact the POC) Malignant neoplasm of kidney, unspecified laterality (H) 1/17/2024    Acute UTI 1/17/2024    Bilateral pulmonary embolism (H) 1/17/2024    Nausea and vomiting, unspecified vomiting type 1/17/2024   Cognition   Affect/Mental Status (Cognition) WFL   Follows Commands (Cognition) WFL   Safety Deficit (Cognition) problem-solving   Range of Motion (ROM)   Range of Motion ROM is WFL   Strength (Manual Muscle Testing)   Strength (Manual Muscle Testing) No deficits observed during functional mobility   Bed Mobility   Comment, (Bed Mobility) because of pain from sore at her sacrum patient slid off of bed on stomach.   Did not know how to stand up from there without directions from therapist  who was able to raise up pllinth   Sit-Stand Transfer   Sit-Stand Yell (Transfers) contact guard;verbal cues   Comment, (Sit-Stand Transfer) stood  from raised ED elliott from supine position   Gait/Stairs (Locomotion)   [place] Level (Gait) contact guard;verbal cues   Assistive Device (Gait) walker, front-wheeled   Distance in Feet (Gait) 20   Pattern (Gait) swing-through   Deviations/Abnormal Patterns (Gait) gait speed decreased;jeannine decreased;stride length decreased;weight shifting decreased   Balance   Balance Comments slight swaying noted with ambulation at times   Clinical Impression   Criteria for Skilled Therapeutic Intervention Yes, treatment indicated   PT Diagnosis (PT) Impaired functional mobility   Influenced by the following impairments weakness, pain, balance   Functional limitations due to impairments transfers, gait   Clinical Presentation (PT Evaluation Complexity) stable   Clinical Presentation Rationale Patient presents as medically diagnosed   Clinical Decision Making (Complexity) low complexity   Planned Therapy Interventions (PT) home exercise program;gait training;stair training;transfer training;patient/family education   Risk & Benefits of therapy have been explained patient   PT Total Evaluation Time   PT Eval, Low Complexity Minutes (96329) 10   Therapy Certification   Start of care date 01/18/24   Certification date from 01/18/24   Certification date to 02/18/24   Physical Therapy Goals   PT Frequency Daily   PT Predicted Duration/Target Date for Goal Attainment 01/25/24   PT Goals Transfers;Gait;Stairs;PT Goal 1   PT: Transfers Modified independent;Sit to/from stand;Assistive device   PT: Gait Modified independent;Rolling walker;150 feet   PT: Stairs 8 stairs;Supervision/stand-by assist;Rail on left   PT: Goal 1 Indep with HEP   Interventions   Interventions Quick Adds Gait Training   Gait Training   Gait Training Minutes (95152) 15   Symptoms Noted During/After Treatment (Gait Training) none   Treatment Detail/Skilled Intervention With walker gait ambulates with slow stable gait,   without walker gait is much slower ,  slightl path deviations noted.  One slight LOB noted when patient switch from walker to no walker and was looking at someone in hallway.  Nathanael stated she was not paying attention.  Up/down 8 steps with single rail  with slow stable reciprocal steps.  VC for safety and use of walker. SBA for gait with use of walker, CGA if not using walker   Distance in Feet 350   [place] Level (Gait Training) contact guard   Physical Assistance Level (Gait Training) supervision;1 person assist;verbal cues   Weight Bearing (Gait Training) full weight-bearing   Assistive Device (Gait Training) rolling walker   Pattern Analysis (Gait Training) swing-through gait   Gait Analysis Deviations decreased jeannine;decreased step length;decreased stride length;decreased toe-to-floor clearance;decreased swing-to-stance ratio   Impairments (Gait Analysis/Training) pain;strength decreased   Stair Railings present on left side   Physical Assist/Nonphysical Assist (Stairs) verbal cues;supervision;1 person assist   Level of [place] (Stairs) contact guard   PT Discharge Planning   PT Plan Progress with gait/transfers,  balance activities   PT Discharge Recommendation (DC Rec) Transitional Care Facility;home with home care physical therapy   PT Rationale for DC Rec Patient mobilizing well overall with use of walker, concerns about patient mentation, problem solving.  At baseline, has not been using device.  Per patient she has no one to stay with or assist at home.  If patient continues to improve would be able to return to home alone, if not would need to consider tcu for further rehabilitation   PT Brief overview of current status Patient cga for gait/transfers   PT Equipment Needed at Discharge walker, rolling   Total Session Time   Timed Code Treatment Minutes 15   Total Session Time (sum of timed and untimed services) 25   [facility]  OUTPATIENT PHYSICAL THERAPY EVALUATION  PLAN OF TREATMENT FOR  OUTPATIENT REHABILITATION  (COMPLETE FOR INITIAL CLAIMS ONLY)  Patient's Last Name, First Name, M.I.  YOB: 1945  Chelsea Peacock                        Provider's Name  [facility] Medical Record No.  [id number]                             Onset Date:  01/17/24   Start of Care Date:  01/18/24   Type:     _X_PT   ___OT   ___SLP Medical Diagnosis:                 PT Diagnosis:  Impaired functional mobility Visits from SOC:  1     See note for plan of treatment, functional goals and certification details    I CERTIFY THE NEED FOR THESE SERVICES FURNISHED UNDER        THIS PLAN OF TREATMENT AND WHILE UNDER MY CARE     (Physician co-signature of this document indicates review and certification of the therapy plan).

## 2024-01-18 NOTE — CONSULTS
Care Management Initial Consult    General Information  Assessment completed with: Patient, Children, Pt and daughter Diane  Type of CM/SW Visit: Initial Assessment  Primary Care Provider verified and updated as needed: Yes   Readmission within the last 30 days: no previous admission in last 30 days   Reason for Consult: discharge planning, health care directive, transportation  Advance Care Planning: Advance Care Planning Reviewed: questions answered        Communication Assessment  Patient's communication style: spoken language (English or Bilingual)        Cognitive  Cognitive/Neuro/Behavioral: WDL                      Living Environment:   People in home: alone (Lost  this last Summer.)     Current living Arrangements: house      Able to return to prior arrangements: other (see comments) (TBD further)     Family/Social Support:  Care provided by: self, child(kristal) (Pt is reportedly I/ADL independent at baseline)  Provides care for: no one, unable/limited ability to care for self  Marital Status:   Children          Description of Support System: Supportive, Involved    Support Assessment: Caregiver difficulty providing physical care/safety, Caregiver experiencing high stress    Current Resources:   Patient receiving home care services: No  Community Resources: None  Equipment currently used at home: raised toilet seat, grab bar, tub/shower  Supplies currently used at home: None    Employment/Financial:  Employment Status: retired     Financial Concerns: none   Referral to Financial Worker: No     Does the patient's insurance plan have a 3 day qualifying hospital stay waiver?  No    Lifestyle & Psychosocial Needs:  Social Determinants of Health     Food Insecurity: Not on file   Depression: Not on file   Housing Stability: Not on file   Tobacco Use: Medium Risk (1/10/2024)    Patient History     Smoking Tobacco Use: Former     Smokeless Tobacco Use: Never     Passive Exposure: Not on file   Financial  "Resource Strain: Not on file   Alcohol Use: Not on file   Transportation Needs: Not on file   Physical Activity: Not on file   Interpersonal Safety: Not on file   Stress: Not on file   Social Connections: Not on file     Functional Status:  Prior to admission patient needed assistance:   Dependent ADLs:: Independent (When at baseline)  Dependent IADLs:: Independent, Transportation (When at baseline. Daughter transports as needed)  Assessment of Functional Status: Not at  functional baseline    Values/Beliefs:  Spiritual, Cultural Beliefs, Latter day Practices, Values that affect care: no (None identified)             Additional Information:  Writer met with pt and her daughter Diane to introduce Care Management(CM), obtain an initial assessment, offer discharge support, and provide SOTO info. Pt resides in a house alone and is reportedly I/ADL independent when at baseline. No DME or in-home service usage. Currently unable to care for self and hopeful for TCU placement until able to transition to an shelter on 2/2. Pt/family was provided with the Medicare Compare list for SNF.  Discussed associated Medicare star ratings to assist with choice for referrals/discharge planning Yes. Education was given to pt/family that star ratings are updated/maintained by Medicare and can be reviewed by visiting www.medicare.gov Yes - daughter will call with preferences for placement.    01/17 per FINN Duong.-\"78 year old female here with nausea and vomiting, generalized weakness.  She is nontoxic here with normal vitals.  Workup initiated by SHELDON she does show what looks like a UTI.  A p.o. challenge here has been successful and initially her thought was to likely discharge home.  However, she complained of some lower abdominal discomfort so we added a CT scan of her chest, abdomen, and pelvis as she also noted that she has been having a cough.  Unfortunately was been discovered is that she actually has bilateral pulmonary emboli but " "fortunately with no right heart strain at this time.  Her EKG is nonischemic.  Unfortunately, it also looks like she has a new renal cancer and will likely need oncologic establishment of care.  Given age and the fact that she also has a UTI with vomiting, I have started heparinization and discussed the case with [facility] resident for admission.  She was given a dose of antibiotics here in the ED as well.\"    PT&OT Consults pending. Anticipate TCU placement vs. Home care at time of discharge. CM to follow-up on therapy consults and to assist with service coordination needs as indicated. Family to transport if pt is able.    Reynaldo Bright RN      "

## 2024-01-18 NOTE — PHARMACY-ADMISSION MEDICATION HISTORY
Pharmacist Admission Medication History    Admission medication history is complete. The information provided in this note is only as accurate as the sources available at the time of the update.    Information Source(s): Patient, Family member, and CareEverywhere/SureScripts via in-person    Pertinent Information: Recently finished 30 days of prednisone and a course of antibiotics.    Changes made to PTA medication list:  Added: brimonidine  Deleted: gabapentin (stated it was not working)  Changed: None    Medication Affordability:       Allergies reviewed with patient and updates made in EHR: yes    Medication History Completed By: Raul Lawler RPH 1/17/2024 7:18 PM    PTA Med List   Medication Sig Last Dose    aspirin 81 mg chewable tablet [ASPIRIN 81 MG CHEWABLE TABLET] Chew 81 mg every other day. 1/15/2024 at pm (takes at hs)    brimonidine (ALPHAGAN) 0.2 % ophthalmic solution Place 1 drop into both eyes 2 times daily 1/15/2024 at pm    calcium carbonate-vitamin D (OSCAL) 500-5 MG-MCG tablet Take 1 tablet by mouth 2 times daily 1/15/2024 at pm    cefdinir (OMNICEF) 300 MG capsule Take 1 capsule (300 mg) by mouth 2 times daily for 7 days     cholecalciferol, vitamin D3, 1,000 unit tablet [CHOLECALCIFEROL, VITAMIN D3, 1,000 UNIT TABLET] Take 2,000 Units by mouth daily. 1/15/2024 at pm (takes at hs)    cyanocobalamin 1000 MCG tablet [CYANOCOBALAMIN 1000 MCG TABLET] Take 2,500 mcg by mouth daily. 1/15/2024 at pm (takes at hs)    furosemide (LASIX) 20 MG tablet Take 20 mg by mouth daily 1/15/2024 at am    glucosamine sulfate (GLUCOSAMINE) 500 mg Tab [GLUCOSAMINE SULFATE (GLUCOSAMINE) 500 MG TAB] Take 1,500 mg by mouth daily. 1/15/2024 at pm (takes at hs)    lisinopril (PRINIVIL,ZESTRIL) 40 MG tablet [LISINOPRIL (PRINIVIL,ZESTRIL) 40 MG TABLET] Take 40 mg by mouth daily. 1/15/2024 at pm (takes at hs)    metoprolol succinate ER (TOPROL XL) 25 MG 24 hr tablet Take 25 mg by mouth daily 1/15/2024 at pm (takes at hs)     Omega-3 Fatty Acids (FISH OIL) 1200 MG capsule Take 1,200 mg by mouth daily 1/15/2024 at pm (takes at hs)    potassium chloride SA (K-DUR,KLOR-CON) 10 MEQ tablet [POTASSIUM CHLORIDE SA (K-DUR,KLOR-CON) 10 MEQ TABLET] Take 10 mEq by mouth daily. 1/15/2024 at am    pravastatin (PRAVACHOL) 80 MG tablet [PRAVASTATIN (PRAVACHOL) 80 MG TABLET] Take 80 mg by mouth bedtime. 1/15/2024 at pm    therapeutic multivitamin (THERAGRAN) tablet [THERAPEUTIC MULTIVITAMIN (THERAGRAN) TABLET] Take 1 tablet by mouth daily. 1/15/2024 at pm (takes at hs)

## 2024-01-18 NOTE — UTILIZATION REVIEW
Concurrent stay review; Secondary Review Determination     Under the authority of the Utilization Management Committee, the utilization review process indicated a secondary review on the above patient.  The review outcome is based on review of the medical records, discussions with staff, and applying clinical experience noted on the date of the review.          (x) Observation Status Appropriate - Concurrent stay review    RATIONALE FOR DETERMINATION     Ms. Peacock is a 77 yo female who presents to the ED with      is a  77 yo female who presents to the ED with SOB and noted on imaging to have bilateral pulmonary emboli and evidence of a new, left-sided renal mass.  US of the lower ext also positive for acute, nonocclusive DVT on the left.  Today has been transitioned off of heparin gtt to DOAC without difficulty.  Vitals are stable; no fevers or hypoxia.  Urology consulted for new renal mass and slight elevation in creat today; recommending monitoring of creat and outpt f/up in the next 2-3 wks.      Hematology consult requested and is pending.  Working with PT/OT/SW for safe disposition.  Not on IVF and tolerating a regular diet.       on to change patient's status to inpatient. The severity of illness, intensity of service provided, expected LOS and risk for adverse outcome make the care appropriate for observation.      The information on this document is developed by the utilization review team in order for the business office to ensure compliance.  This only denotes the appropriateness of proper admission status and does not reflect the quality of care rendered.         The definitions of Inpatient Status and Observation Status used in making the determination above are those provided in the CMS Coverage Manual, Chapter 1 and Chapter 6, section 70.4.      Sincerely,     Elina Martinez, DO  Utilization Review  Physician Advisor

## 2024-01-18 NOTE — PLAN OF CARE
"Problem: UTI (Urinary Tract Infection)  Goal: Improved Infection Symptoms  Outcome: Progressing     Problem: Wound  Goal: Optimal Pain Control and Function  Outcome: Progressing  Intervention: Prevent or Manage Pain  Recent Flowsheet Documentation  Taken 1/18/2024 0316 by Justina Hernández RN  Pain Management Interventions:   repositioned   rest  Taken 1/17/2024 2330 by Justina Hernández RN  Pain Management Interventions:   repositioned   rest  Taken 1/17/2024 2002 by Justina Hernández RN  Pain Management Interventions:   repositioned   rest     Problem: Wound  Goal: Skin Health and Integrity  Outcome: Progressing  Intervention: Optimize Skin Protection  Recent Flowsheet Documentation  Taken 1/18/2024 0444 by Justina Hernández RN  Activity Management:   activity adjusted per tolerance   ambulated to bathroom   back to bed  Head of Bed (HOB) Positioning: HOB at 20 degrees  Taken 1/18/2024 0316 by Justina Hernández RN  Skin Protection: adhesive use limited  Activity Management: activity adjusted per tolerance  Head of Bed (HOB) Positioning: HOB at 20 degrees  Taken 1/17/2024 2330 by Justina Hernández RN  Skin Protection: adhesive use limited  Activity Management: activity adjusted per tolerance  Head of Bed (HOB) Positioning: HOB at 20 degrees  Taken 1/17/2024 2250 by Justina Hernández RN  Activity Management:   activity adjusted per tolerance   ambulated to bathroom   back to bed  Head of Bed (HOB) Positioning: HOB at 20-30 degrees  Taken 1/17/2024 2002 by Justina Hernández RN  Skin Protection: adhesive use limited  Activity Management: activity adjusted per tolerance  Head of Bed (HOB) Positioning: HOB at 20-30 degrees    PRIMARY DIAGNOSIS: \"GENERIC\" NURSING  OUTPATIENT/OBSERVATION GOALS TO BE MET BEFORE DISCHARGE:  ADLs back to baseline: No    Activity and level of assistance: Up with maximum assistance. Consider SW and/or PT evaluation.     Pain status: Improved-controlled with oral pain medications.    Return to near baseline physical " activity: No     Discharge Planner Nurse   Safe discharge environment identified: No  Barriers to discharge: Yes       Entered by: Justina Hernández RN 01/18/2024 6:15 AM     Please review provider order for any additional goals.   Nurse to notify provider when observation goals have been met and patient is ready for discharge.    Goal Outcome Evaluation:  Patient A&O x4, able to make needs known. VSS on RA. Pain rated 2-4/10 on coccyx area. Denied need for pain medication. Left PIV infusing Heparin at 1000Units/hr. Next Xa draw is scheduled for 0842. Intermittent IV antibiotics. Takes pills whole one at a time. Infrequent, congested cough. RUE numbness and tingling, baseline. +1-2 BUE and BLE edema. Wound on coccyx, open to air. Voiding spontaneously. No BM during shift. Regular diet, poor appetite, but ate 2 cups of jello throughout the shift. Assist of 1 with walker and gait belt. Discharge pending.

## 2024-01-18 NOTE — H&P
[facility]    History and Physical - Hospitalist Service       Date of Admission:  1/17/2024    Assessment & Plan      Chelsea Peacock is a 78 year old female admitted on 1/17/2024. She has a history of PMR, Osteoporosis, HTN, HLD and is admitted for bilateral pulmonary emboli and new left renal mass.     Bilateral PE  CT chest showing acute bilateral PE without evidence of right heart strain. Patient reporting decreased activity level but also concern for renal cell carcinoma on CT abdomen so provoked may be likely.  - Admit to Observation  - Heparin infusion  - Test claim for DOAC   - US BLE to eval DVT  - BMP in AM    Left Renal Mass  Pulmonary nodule   CT abdomen/pelvis showing new left renal mass suspicious for renal cell carcinoma. She does endorse weakness, poor appetite, weight loss, and presents with bilateral PE so malignancy does seem likely however could also be benign pathology.   - Urology consult, appreciate recs  - Oncology consult, appreciate recs    Acute Cystitis   UA with +leuk esterase, WBC and report suprapubic tenderness. Will treat. CTX started in ED.   - Ceftriaxone IV Q24  - Follow UC    Sacral Wound  Not examined as patient was lying in hallway ED bed. Reports she recently completed course of Keflex but did not finish last 2 days due to nausea.   - WOCN consult     Generalized Weakness  FTT  Has been ongoing for 2-3 months. Likely related to above diagnosis. Also unclear on how long she has been on steroids and when stopped. Concern for component of adrenal insufficiency.  - Manage per above  - Start Prednisone 20 mg PO daily  - PT/OT consult     Polymyalgia Rheumatica  Had been on steroid taper since 11/21. Prednisone 25 mg ddaily x 28 days then 20 mg daily for 21 days. Patient reports she completed. I see a note from 1/11 recommending she start 20 mg daily again for ongoing pain symptoms. We will initiate the recommended dose for now and see if her weakness  improves.   - Prednisone 20 mg daily  - Acetaminophen PRN for pain      Chronic Conditions:  HTN: Continue PTA lisinopril, Metoprolol with hold parameters. Patient report she is not taking Lasix so will hold.   Osteoporosis: Hold PTA risedronate   HLD:  Continue PTA Pravastatin      Observation Goals: -diagnostic tests and consults completed and resulted, -vital signs normal or at patient baseline, -tolerating oral intake to maintain hydration, -adequate pain control on oral analgesics, -returns to baseline functional status, -safe disposition plan has been identified, Nurse to notify provider when observation goals have been met and patient is ready for discharge.  Diet: Regular Diet Adult  DVT Prophylaxis: Heparin infusion  Blanc Catheter: Not present  Fluids: PO  Lines: None     Cardiac Monitoring: None  Code Status: No CPR- Do NOT Intubate              # Hypoalbuminemia: Lowest albumin = 3 g/dL at 1/17/2024  2:26 PM, will monitor as appropriate   # Drug Induced Platelet Defect: home medication list includes an antiplatelet medication   # Hypertension: Noted on problem list                 Disposition Plan      Expected Discharge Date: 01/18/2024                The patient's care was discussed with the Attending Physician, Dr. Louis Pepe. Dr Precious Dumont will see the patient in the morning .      Pricilla Mccabe MD  Hospitalist Service  [facility]  ______________________________________________________________________    Chief Complaint   Generalized weakness    History is obtained from the patient    History of Present Illness   Chelsea Peacock is a 78 year old female admitted on 1/17/2024. She has a history of PMR, Osteoporosis, HTN, HLD and presented with generalized weakness, failure to thrive.   Patient has been dealing with ongoing fatigue and generalized weakness since October 2023.  She reports she has been to the ED a couple of times for this.  Once for back hip pain and hand pain,  she was diagnosed later with polymyalgia rheumatica and started on steroid course.  She notes no improvement with the initiation of steroids.  She has had intermittent nausea and poor appetite.  She has lost weight, over the last 2 years she has lost over 100 pounds.  Her goal is 155 pounds over the last couple of months she has lost weight down to 130 pounds more unintentionally.  She has also been dealing with a tailbone infection and completed a course of cephalexin for this.  She has ongoing pain at her tailbone related to the sore in that area.  She normally lives at home and has a wound care nurse come to check her sacral wound.  Her children are living nearby and involved in her care.  She has been overall having a harder time getting around and taking care of herself due to weakness.        Past Medical History    No past medical history on file.    Past Surgical History   No past surgical history on file.    Prior to Admission Medications   Prior to Admission Medications   Prescriptions Last Dose Informant Patient Reported? Taking?   Omega-3 Fatty Acids (FISH OIL) 1200 MG capsule 1/15/2024 at pm (takes at hs)  Yes Yes   Sig: Take 1,200 mg by mouth daily   RISEdronate (ACTONEL) 35 MG tablet Not started  No No   Sig: Take 1 tablet (35 mg) by mouth every 7 days Take 30 minutes before any food, drink, or other medications. Remain upright for 30 minutes after taking.   aspirin 81 mg chewable tablet 1/15/2024 at pm (takes at hs)  Yes Yes   Sig: [ASPIRIN 81 MG CHEWABLE TABLET] Chew 81 mg every other day.   brimonidine (ALPHAGAN) 0.2 % ophthalmic solution 1/15/2024 at pm  Yes Yes   Sig: Place 1 drop into both eyes 2 times daily   calcium carbonate-vitamin D (OSCAL) 500-5 MG-MCG tablet 1/15/2024 at pm  No Yes   Sig: Take 1 tablet by mouth 2 times daily   cholecalciferol, vitamin D3, 1,000 unit tablet 1/15/2024 at pm (takes at hs)  Yes Yes   Sig: [CHOLECALCIFEROL, VITAMIN D3, 1,000 UNIT TABLET] Take 2,000 Units by  mouth daily.   cyanocobalamin 1000 MCG tablet 1/15/2024 at pm (takes at hs)  Yes Yes   Sig: [CYANOCOBALAMIN 1000 MCG TABLET] Take 2,500 mcg by mouth daily.   furosemide (LASIX) 20 MG tablet 1/15/2024 at am  Yes Yes   Sig: Take 20 mg by mouth daily   glucosamine sulfate (GLUCOSAMINE) 500 mg Tab 1/15/2024 at pm (takes at hs)  Yes Yes   Sig: [GLUCOSAMINE SULFATE (GLUCOSAMINE) 500 MG TAB] Take 1,500 mg by mouth daily.   lisinopril (PRINIVIL,ZESTRIL) 40 MG tablet 1/15/2024 at pm (takes at hs)  Yes Yes   Sig: [LISINOPRIL (PRINIVIL,ZESTRIL) 40 MG TABLET] Take 40 mg by mouth daily.   metoprolol succinate ER (TOPROL XL) 25 MG 24 hr tablet 1/15/2024 at pm (takes at hs)  Yes Yes   Sig: Take 25 mg by mouth daily   potassium chloride SA (K-DUR,KLOR-CON) 10 MEQ tablet 1/15/2024 at am  Yes Yes   Sig: [POTASSIUM CHLORIDE SA (K-DUR,KLOR-CON) 10 MEQ TABLET] Take 10 mEq by mouth daily.   pravastatin (PRAVACHOL) 80 MG tablet 1/15/2024 at pm  Yes Yes   Sig: [PRAVASTATIN (PRAVACHOL) 80 MG TABLET] Take 80 mg by mouth bedtime.   therapeutic multivitamin (THERAGRAN) tablet 1/15/2024 at pm (takes at hs)  Yes Yes   Sig: [THERAPEUTIC MULTIVITAMIN (THERAGRAN) TABLET] Take 1 tablet by mouth daily.      Facility-Administered Medications: None        Review of Systems    The 10 point Review of Systems is negative other than noted in the HPI or here.     Social History   I have reviewed this patient's social history and updated it with pertinent information if needed.  Social History     Tobacco Use    Smoking status: Former     Types: Cigarettes    Smokeless tobacco: Never   Vaping Use    Vaping Use: Never used         Family History     No significant family history, including no history of: NA      Allergies   Allergies   Allergen Reactions    Cortisone Itching    Sulfa (Sulfonamide Antibiotics) [Sulfa Antibiotics] Itching        Physical Exam   Vital Signs: Temp: 97.6  F (36.4  C) Temp src: Oral BP: 118/60 Pulse: 55   Resp: 18 SpO2: 97 % O2  Device: None (Room air)    Weight: 130 lbs 0 oz    Constitutional: awake, alert, cooperative, no apparent distress, and appears stated age  Eyes: Lids and lashes normal  Respiratory: No increased work of breathing, good air exchange, clear to auscultation bilaterally, no crackles or wheezing  Cardiovascular: Normal apical impulse, regular rate and rhythm, normal S1 and S2. +1 BLE edema   GI: Normal bowel sounds, soft, non-distended, non-tender, no masses palpated, no hepatosplenomegaly. + mild suprapubic tenderness  Skin: no bruising or bleeding  Musculoskeletal: There is no redness, warmth, or swelling of the joints.  Full range of motion noted.    Neurologic: Awake, alert, oriented to name, place and time.  Cranial nerves II-XII are grossly intact.  Moves all extremities equally  Neuropsychiatric: General: normal, calm, and normal eye contact        Data     I have personally reviewed the following data over the past 24 hrs:    10.0  \   11.3 (L)   / 193     140 106 32.5 (H) /  95   4.3 25 1.14 (H) \     ALT: 7 AST: 30 AP: 56 TBILI: 0.9   ALB: 3.0 (L) TOT PROTEIN: 5.8 (L) LIPASE: 57     Trop: 19 (H) BNP: 1,016       Imaging results reviewed over the past 24 hrs:   Recent Results (from the past 24 hour(s))   CT Chest/Abdomen/Pelvis w Contrast    Narrative    EXAM: CT CHEST/ABDOMEN/PELVIS W CONTRAST  LOCATION: [facility]  DATE: 1/17/2024    INDICATION: Nausea, productive cough, lower abdominal pain  COMPARISON: None.  TECHNIQUE: CT scan of the chest, abdomen, and pelvis was performed following injection of IV contrast. Multiplanar reformats were obtained. Dose reduction techniques were used.   CONTRAST: 75ml Isovue 370    FINDINGS:   LUNGS AND PLEURA: Moderate centrilobular and paraseptal emphysema. Mild dependent atelectasis bilaterally. 0.4 cm round, noncalcified right lower lobe pulmonary nodule (series 4, image 114). Calcified granulomas left lower lobe.     MEDIASTINUM/AXILLAE:  Acute-appearing pulmonary emboli involving the right main pulmonary artery and lobar, segmental, and subsegmental arterial branches in the right upper, right middle, and right lower lobes and segmental branches in the left lower   lobe. No evidence of right heart strain. Normal variant direct origin of the left vertebral artery from the aortic arch.     CORONARY ARTERY CALCIFICATION: Mild calcified atherosclerosis left anterior descending coronary artery.     HEPATOBILIARY: Normal.    PANCREAS: Normal.    SPLEEN: Normal.    ADRENAL GLANDS: Normal.    KIDNEYS/BLADDER: 2.7 x 2.6 cm solid, enhancing mass in the mid left kidney (series 3, image 166). No violation of the pararenal fascia. No left renal vein involvement. Tiny right kidney cyst, requiring no follow-up.     BOWEL: Normal, including the appendix.     LYMPH NODES: Normal.    VASCULATURE: Moderate atherosclerosis.     PELVIC ORGANS: Pelvic phleboliths. Tiny calcified uterine fibroids. 3.4 cm left ovarian cyst.     MUSCULOSKELETAL: Mild degenerative change in the spine. Slight anterolisthesis of L4 on L5.       Impression    IMPRESSION:  1.  Acute-appearing bilateral pulmonary emboli. No pulmonary infarction or right heart strain.  2.  2.7 cm solid left kidney mass represents renal cell carcinoma until proven otherwise. 4 mm right lower lobe pulmonary nodule could represent a metastasis, but is nonspecific and benign etiologies are in the differential. Recommend urology   consultation when clinically feasible.     Discussed by telephone with Dr. Vásquez at 6:18 PM on 01/17/2024.

## 2024-01-18 NOTE — PROGRESS NOTES
"PRIMARY DIAGNOSIS: \"GENERIC\" NURSING  OUTPATIENT/OBSERVATION GOALS TO BE MET BEFORE DISCHARGE:  ADLs back to baseline: No    Activity and level of assistance: Up with maximum assistance. Consider SW and/or PT evaluation.     Pain status: Improved-controlled with oral pain medications.    Return to near baseline physical activity: No     Discharge Planner Nurse   Safe discharge environment identified: No  Barriers to discharge: Yes       Entered by: Justina Hernández RN 01/17/2024 11:06 PM     Please review provider order for any additional goals.   Nurse to notify provider when observation goals have been met and patient is ready for discharge.  "

## 2024-01-18 NOTE — PROGRESS NOTES
VSS on RA. Pt is alert and oriented. Calls appropriately for needs. SBA w/ gb and walker.  Pt tolerating regular diet. IV saline locked. Voids spontaneously. C/o pain in her sacral area, prn tylenol given which was effective.

## 2024-01-18 NOTE — CONSULTS
[facility] CONSULT       Type of consult: inpatient  Place of service: [facility]   Reason for consult: left renal mass  Requested by: Dr. ERASMO Mccabe    History of present illness:   Chelsea Peacock is a 78 year old female with hx of osteoporosis, polymyalgia rheumatica, GERD, HTN, HLD; Admitted through the ED  with c/o nausea x 4 weeks, vomiting x 3 days, slightly productive cough and non-healing tailbone ulcer and diagnosed with bilateral PE and new left renal mass on CT . Urology was consulted for left renal mass. History is obtained from patient and chart review.     CT Chest/Abd/Pelvis (with contrast) 24 showed acute appearing bilateral PE, no pulmonary infarction or right heart strain, a 2.7 cm solid left kidney mass and a 4 mm RLL pulmonary nodule.     Labs : UA  mildly concerning for infection vs contamination. UC pending. WBC 10.9. Hgb 13.6. Creatinine 1.14 (base appears 1.03-1.44).     Pt reports generalized full body/back pain which she relates to polymyalgia rheumatica. Denies left flank/abdominal pain, gross hematuria.     Pt is a former smoker, quit in . No known chemical exposures. No personal or family hx of renal or bladder cancer. Patient's mother  of uterine cancer.     Son is present in room.     Past medical history:  No past medical history on file.    Past surgical history  No past surgical history on file.    Social history  Social History     Socioeconomic History    Marital status:      Spouse name: Not on file    Number of children: Not on file    Years of education: Not on file    Highest education level: Not on file   Occupational History    Not on file   Tobacco Use    Smoking status: Former     Types: Cigarettes    Smokeless tobacco: Never   Vaping Use    Vaping Use: Never used   Substance and Sexual Activity    Alcohol use: Not on file    Drug use: Not on file    Sexual activity: Not on file   Other Topics Concern    Not on file    Social History Narrative    Not on file     Social Determinants of Health     Financial Resource Strain: Not on file   Food Insecurity: Not on file   Transportation Needs: Not on file   Physical Activity: Not on file   Stress: Not on file   Social Connections: Not on file   Interpersonal Safety: Not on file   Housing Stability: Not on file     Medications  Current Facility-Administered Medications   Medication    acetaminophen (TYLENOL) tablet 650 mg    Or    acetaminophen (TYLENOL) Suppository 650 mg    brimonidine (ALPHAGAN) 0.2 % ophthalmic solution 1 drop    cefTRIAXone (ROCEPHIN) 1 g vial to attach to  mL bag for ADULTS or NS 50 mL bag for PEDS    [Held by provider] furosemide (LASIX) tablet 20 mg    heparin 25,000 units in 0.45% NaCl 250 mL ANTICOAGULANT infusion    lisinopril (ZESTRIL) tablet 40 mg    melatonin tablet 1 mg    metoprolol succinate ER (TOPROL XL) 24 hr tablet 25 mg    ondansetron (ZOFRAN ODT) ODT tab 4 mg    Or    ondansetron (ZOFRAN) injection 4 mg    Patient is already receiving anticoagulation with heparin, enoxaparin (LOVENOX), warfarin (COUMADIN)  or other anticoagulant medication    pravastatin (PRAVACHOL) tablet 80 mg    predniSONE (DELTASONE) tablet 20 mg    senna-docusate (SENOKOT-S/PERICOLACE) 8.6-50 MG per tablet 1 tablet    Or    senna-docusate (SENOKOT-S/PERICOLACE) 8.6-50 MG per tablet 2 tablet     Current Outpatient Medications   Medication    aspirin 81 mg chewable tablet    brimonidine (ALPHAGAN) 0.2 % ophthalmic solution    calcium carbonate-vitamin D (OSCAL) 500-5 MG-MCG tablet    cefdinir (OMNICEF) 300 MG capsule    cholecalciferol, vitamin D3, 1,000 unit tablet    cyanocobalamin 1000 MCG tablet    furosemide (LASIX) 20 MG tablet    glucosamine sulfate (GLUCOSAMINE) 500 mg Tab    lisinopril (PRINIVIL,ZESTRIL) 40 MG tablet    metoprolol succinate ER (TOPROL XL) 25 MG 24 hr tablet    Omega-3 Fatty Acids (FISH OIL) 1200 MG capsule    potassium chloride SA  "(K-DUR,KLOR-CON) 10 MEQ tablet    pravastatin (PRAVACHOL) 80 MG tablet    therapeutic multivitamin (THERAGRAN) tablet    RISEdronate (ACTONEL) 35 MG tablet       Allergies  Allergies   Allergen Reactions    Cortisone Itching    Sulfa (Sulfonamide Antibiotics) [Sulfa Antibiotics] Itching       Review of systems  12 point review of system is otherwise negative except what is stated in HPI    Physical exam:  /58 (BP Location: Right arm, Patient Position: Semi-Torres's, Cuff Size: Adult Regular)   Pulse 50   Temp 97.6  F (36.4  C) (Oral)   Resp 18   Ht 1.6 m (5' 3\")   Wt 59 kg (130 lb)   SpO2 99%   BMI 23.03 kg/m     GENERAL: NAD, alert, cooperative  HEAD: normocephalic/atraumatic   ABDOMEN: Soft, non- tender, non- distended, no palpable masses, no rebound or peritoneal signs, and no CVA tenderness.   SKIN: no rashes or lesions  MUSCULOSKELETAL: moves all four extremities equally, no pedal edema  PSYCHOLOGICAL: alert and oriented, answers questions appropriately    Labs:   Lab Results   Component Value Date    WBC 8.3 01/18/2024    HGB 10.8 (L) 01/18/2024    HCT 32.5 (L) 01/18/2024     01/18/2024    CHOL 149 05/24/2023    TRIG 106 05/24/2023    HDL 56 05/24/2023    ALT 7 01/17/2024    AST 30 01/17/2024     01/18/2024    BUN 30.3 (H) 01/18/2024    CO2 25 01/18/2024    TSH 3.07 11/16/2021     UA RESULTS:  Recent Labs   Lab Test 01/17/24  1540   COLOR Yellow   APPEARANCE Turbid*   URINEGLC Negative   URINEBILI Negative   URINEKETONE Negative   SG 1.027   UBLD Negative   URINEPH 5.0   PROTEIN 20*   NITRITE Negative   LEUKEST 250 Destiny/uL*   RBCU 3*   WBCU 54*     Urine Culture 1/17: pending    I have personally reviewed these labs.     Imaging:  EXAM: CT CHEST/ABDOMEN/PELVIS W CONTRAST  LOCATION: [facility]  DATE: 1/17/2024     INDICATION: Nausea, productive cough, lower abdominal pain  COMPARISON: None.  TECHNIQUE: CT scan of the chest, abdomen, and pelvis was performed " following injection of IV contrast. Multiplanar reformats were obtained. Dose reduction techniques were used.   CONTRAST: 75ml Isovue 370     FINDINGS:   LUNGS AND PLEURA: Moderate centrilobular and paraseptal emphysema. Mild dependent atelectasis bilaterally. 0.4 cm round, noncalcified right lower lobe pulmonary nodule (series 4, image 114). Calcified granulomas left lower lobe.      MEDIASTINUM/AXILLAE: Acute-appearing pulmonary emboli involving the right main pulmonary artery and lobar, segmental, and subsegmental arterial branches in the right upper, right middle, and right lower lobes and segmental branches in the left lower   lobe. No evidence of right heart strain. Normal variant direct origin of the left vertebral artery from the aortic arch.      CORONARY ARTERY CALCIFICATION: Mild calcified atherosclerosis left anterior descending coronary artery.      HEPATOBILIARY: Normal.     PANCREAS: Normal.     SPLEEN: Normal.     ADRENAL GLANDS: Normal.     KIDNEYS/BLADDER: 2.7 x 2.6 cm solid, enhancing mass in the mid left kidney (series 3, image 166). No violation of the pararenal fascia. No left renal vein involvement. Tiny right kidney cyst, requiring no follow-up.      BOWEL: Normal, including the appendix.      LYMPH NODES: Normal.     VASCULATURE: Moderate atherosclerosis.      PELVIC ORGANS: Pelvic phleboliths. Tiny calcified uterine fibroids. 3.4 cm left ovarian cyst.      MUSCULOSKELETAL: Mild degenerative change in the spine. Slight anterolisthesis of L4 on L5.                                                                       IMPRESSION:  1.  Acute-appearing bilateral pulmonary emboli. No pulmonary infarction or right heart strain.  2.  2.7 cm solid left kidney mass represents renal cell carcinoma until proven otherwise. 4 mm right lower lobe pulmonary nodule could represent a metastasis, but is nonspecific and benign etiologies are in the differential. Recommend urology   consultation when clinically  feasible.     I have reviewed the imaging reports above.     Assessment/plan:   Chelsea Peacock is being seen by [facility] for 2.7 cm left renal mass    - 78 yr old female with 2.7 cm left renal mass on CT concerning for RCC. Also noted on CT, bilateral PE and a 4 mm RLL pulmonary nodule.   - No left flank/abdominal pain.   - Creatinine 1.25 today, 1.14 on 1/17 admission (baseline 1.03-1.44). Had contrast CT 1/17. Avoid nephrotoxins. Monitor.   - UA mildly concerning for infection vs contamination. If growth, recommend completing 7 day antibiotic course.   - Old records reviewed: [facility] [place]  - Email sent to [facility] schedulers to arrange for follow up with surgeon in 2-3 weeks. [facility] contact info added to discharge orders.   - [facility] will sign off. Please re-consult with any new or worsening urologic concerns.    The patient and I discussed etiologies of renal masses which include benign and malignant masses.  We discussed that most renal masses are found incidentally, are slow growing and are asymptomatic until late in the disease.  We discussed follow-up appointment as an outpatient.  At this visit the patient will discuss their options, in detail with the urologist and whether further work-up is needed.  They will discuss if a biopsy is indicated or not, however in most cases it is not. They will also decide on a treatment plan that may include, active surveillance, cryoablation, partial or radical nephrectomy. Patient demonstrated understanding. All questions and concerns were answered in detail.    Case discussed with: Dr. Brewer      Thank you for consulting [facility] regarding this patient's care.     Jignesh Delcid, APRN, CNP  [facility]   [phone]

## 2024-01-18 NOTE — PROGRESS NOTES
[facility]    Progress Note - Hospitalist Service       Date of Admission:  1/17/2024    Assessment & Plan   Chelsea Peacock is a 78 year old female admitted on 1/17/2024. She has a history of PMR, Osteoporosis, HTN, HLD and is admitted for bilateral pulmonary emboli and new left renal mass.     Bilateral PE  CT chest showing acute bilateral PE without evidence of right heart strain. Patient reporting decreased activity level but also concern for renal cell carcinoma on CT abdomen so provoked may be likely. US BLE to eval DVT - Nonocculsive DVT left posterior tibial vein within mid calf.  Heparin infusion discontinued - begin therapy with Xarelto:  Xarelto 15mg BID x21 days followed by 20mg x6 months     Left Renal Mass  Pulmonary nodule   CT abdomen/pelvis showing new left renal mass suspicious for renal cell carcinoma. She does endorse weakness, poor appetite, weight loss, and presents with bilateral PE so malignancy does seem likely however could also be benign pathology. Urology recommending outpatient follow-up.  Oncology consult, appreciate recs     Acute Cystitis   UA with +leuk esterase, WBC.  Ceftriaxone IV discontinued, patient asymptomatic and not complaining of any burning with urination  Follow UC  Urology signed off, recommending outpatient follow-up.     Sacral Wound  Not examined as patient was lying in hallway ED bed. Reports she recently completed course of Keflex but did not finish last 2 days due to nausea.   WOC consult - see note     Generalized Weakness  FTT  Has been ongoing for 2-3 months. Likely related to above diagnosis. Also unclear on how long she has been on steroids and when stopped. Concern for component of adrenal insufficiency.  Manage per above  Start Prednisone 20mg PO daily  PT/OT consult      Polymyalgia Rheumatica  Had been on steroid taper since 11/21. Prednisone 25 mg ddaily x 28 days then 20 mg daily for 21 days. Patient reports she completed. I  see a note from 1/11 recommending she start 20 mg daily again for ongoing pain symptoms. We will initiate the recommended dose for now and see if her weakness improves.   Prednisone 20 mg daily  Acetaminophen PRN for pain        Chronic Conditions:  HTN: Continue PTA lisinopril, Metoprolol with hold parameters. Patient report she is not taking Lasix so will hold.   Osteoporosis: Hold PTA risedronate   HLD:  Continue PTA Pravastatin       Observation Goals: -diagnostic tests and consults completed and resulted, -vital signs normal or at patient baseline, -tolerating oral intake to maintain hydration, -adequate pain control on oral analgesics, -returns to baseline functional status, -safe disposition plan has been identified, Nurse to notify provider when observation goals have been met and patient is ready for discharge.  Diet: Regular Diet Adult    DVT Prophylaxis: DOAC  Blanc Catheter: Not present  Fluids: PO  Lines: None     Cardiac Monitoring: None  Code Status: No CPR- Do NOT Intubate      Clinically Significant Risk Factors Present on Admission              # Hypoalbuminemia: Lowest albumin = 3 g/dL at 1/17/2024  2:26 PM, will monitor as appropriate   # Drug Induced Platelet Defect: home medication list includes an antiplatelet medication   # Hypertension: Noted on problem list          # Financial/Environmental Concerns: none         Disposition Plan      Expected Discharge Date: 01/18/2024      Destination: nursing home (For Transitional Care services)          The patient's care was discussed with the Attending Physician, Dr. Precious Dumont .    Molina Perez DO  Hospitalist Service  [facility]  Securely message with [employer] (more info)  Text page via [employer] Paging/Directory   ______________________________________________________________________    Interval History   Patient resting in bed comfortably, informed of 4mm RLL mass and bilateral pulmonary embolisms. Wound consult redressed  sacral ulcer. Heparin discontinued, Xarelto begun for pulmonary embolism treatment. Patient ambulating around unit supervised.    Physical Exam   Vital Signs: Temp: 97.6  F (36.4  C) Temp src: Oral BP: 120/58 Pulse: 50   Resp: 18 SpO2: 99 % O2 Device: None (Room air)    Weight: 130 lbs 0 oz    Constitutional: awake, alert, cooperative, no apparent distress  Eyes: Lids and lashes normal, pupils equal, sclera clear, conjunctiva normal  ENT: Normocephalic, without obvious abnormality, atraumatic, external ears without lesions, oral pharynx with moist mucous membranes  Respiratory: No increased work of breathing, good air exchange, clear to auscultation bilaterally, no crackles or wheezing  Cardiovascular: Regular rate and rhythm, no murmur noted  GI: Bowel sounds present, abdomen soft, non-distended, non-tender, no masses palpated, no hepatosplenomegaly  Skin: Stage 3 sacral ulcer, see image below. Granulation tissue, no purulence noted. B/l upper and lower extremity lipoedema  Psych: Appropriate mood and affect        Medical Decision Making       Please see A&P for additional details of medical decision making.      Data   ------------------------- PAST 24 HR DATA REVIEWED -----------------------------------------------    I have personally reviewed the following data over the past 24 hrs:    8.3  \   10.8 (L)   / 178     135 104 30.3 (H) /  87   4.5 25 1.25 (H) \     ALT: 7 AST: 30 AP: 56 TBILI: 0.9   ALB: 3.0 (L) TOT PROTEIN: 5.8 (L) LIPASE: 57     Trop: 19 (H) BNP: 1,016       Imaging results reviewed over the past 24 hrs:   Recent Results (from the past 24 hour(s))   CT Chest/Abdomen/Pelvis w Contrast    Narrative    EXAM: CT CHEST/ABDOMEN/PELVIS W CONTRAST  LOCATION: [facility]  DATE: 1/17/2024    INDICATION: Nausea, productive cough, lower abdominal pain  COMPARISON: None.  TECHNIQUE: CT scan of the chest, abdomen, and pelvis was performed following injection of IV contrast.  Multiplanar reformats were obtained. Dose reduction techniques were used.   CONTRAST: 75ml Isovue 370    FINDINGS:   LUNGS AND PLEURA: Moderate centrilobular and paraseptal emphysema. Mild dependent atelectasis bilaterally. 0.4 cm round, noncalcified right lower lobe pulmonary nodule (series 4, image 114). Calcified granulomas left lower lobe.     MEDIASTINUM/AXILLAE: Acute-appearing pulmonary emboli involving the right main pulmonary artery and lobar, segmental, and subsegmental arterial branches in the right upper, right middle, and right lower lobes and segmental branches in the left lower   lobe. No evidence of right heart strain. Normal variant direct origin of the left vertebral artery from the aortic arch.     CORONARY ARTERY CALCIFICATION: Mild calcified atherosclerosis left anterior descending coronary artery.     HEPATOBILIARY: Normal.    PANCREAS: Normal.    SPLEEN: Normal.    ADRENAL GLANDS: Normal.    KIDNEYS/BLADDER: 2.7 x 2.6 cm solid, enhancing mass in the mid left kidney (series 3, image 166). No violation of the pararenal fascia. No left renal vein involvement. Tiny right kidney cyst, requiring no follow-up.     BOWEL: Normal, including the appendix.     LYMPH NODES: Normal.    VASCULATURE: Moderate atherosclerosis.     PELVIC ORGANS: Pelvic phleboliths. Tiny calcified uterine fibroids. 3.4 cm left ovarian cyst.     MUSCULOSKELETAL: Mild degenerative change in the spine. Slight anterolisthesis of L4 on L5.       Impression    IMPRESSION:  1.  Acute-appearing bilateral pulmonary emboli. No pulmonary infarction or right heart strain.  2.  2.7 cm solid left kidney mass represents renal cell carcinoma until proven otherwise. 4 mm right lower lobe pulmonary nodule could represent a metastasis, but is nonspecific and benign etiologies are in the differential. Recommend urology   consultation when clinically feasible.     Discussed by telephone with Dr. Vásquez at 6:18 PM on 01/17/2024.     Lower  Extremity Venous Duplex Bilateral    Addendum: 1/18/2024    Addendum: Results given to Dr. Roxanna Mccabe at 2252      Narrative    EXAM: US LOWER EXTREMITY VENOUS DUPLEX BILATERAL  LOCATION: [facility]  DATE: 1/17/2024    INDICATION: eval dvt, has PEs  COMPARISON: None.  TECHNIQUE: Venous Duplex ultrasound of bilateral lower extremities with and without compression, augmentation and duplex. Color flow and spectral Doppler with waveform analysis performed.    FINDINGS: Exam includes the common femoral, femoral, popliteal veins as well as segmentally visualized deep calf veins and greater saphenous vein.     RIGHT: No deep vein thrombosis. No superficial thrombophlebitis. No popliteal cyst.    LEFT: 3 cm nonocclusive thrombus left posterior tibial vein in the mid calf. No superficial thrombophlebitis. No popliteal cyst.      Impression    IMPRESSION:  1.  Nonocclusive DVT left posterior tibial vein within the mid calf.  2.  Negative for DVT right lower extremity.

## 2024-01-18 NOTE — PROGRESS NOTES
Chart review only:    Consult received for incidentally noted renal mass the CT scan.  She presented with nausea and vomiting that been going on for some time along with productive cough.  CT angio of the chest showed bilateral PE without any evidence of right heart strain or infarction.  It also showed a 2.7 cm solid enhancing mass in the left kidney consistent with possible primary renal malignancy.  It also showed a small 4 mm right lower lobe pulmonary nodule.  Urology has been consulted and recommend outpatient follow-up.  For PE she is currently on anticoagulation and looks like manage send her home on DOAC.  Agree with this plan.  This appears unprovoked so may be a candidate for indefinite anticoagulation in the absence of bleeding risk factors.  From the renal mass standpoint, at this point, oncology does not have any further recommendations.  Agree with outpatient workup and management per urology.  She may not need systemic therapy for her kidney cancer if it is local since the tumor looks pretty small.  Probably will need follow-up of the lung nodule in the future.  Please put a referral to heme-onc as an outpatient.  Nonurgent.    Phoenix Quinteros MD  Hematology and Medical Oncology  [facility]

## 2024-01-18 NOTE — CONSULTS
"[facility] Nurse Inpatient Assessment     Consulted for: sacral wound    Summary: patient stated she has been dealing with this wound since 2023    Patient History (according to provider note(s):      Chelsea Peacock is a 78 year old female with hx of osteoporosis, polymyalgia rheumatica, GERD, HTN, HLD; Admitted through the ED  with c/o nausea x 4 weeks, vomiting x 3 days, slightly productive cough and non-healing tailbone ulcer and diagnosed with bilateral PE and new left renal mass on CT . Urology was consulted for left renal mass. History is obtained from patient and chart review.      CT Chest/Abd/Pelvis (with contrast) 24 showed acute appearing bilateral PE, no pulmonary infarction or right heart strain, a 2.7 cm solid left kidney mass and a 4 mm RLL pulmonary nodule.      Labs : UA  mildly concerning for infection vs contamination. UC pending. WBC 10.9. Hgb 13.6. Creatinine 1.14 (base appears 1.03-1.44).      Pt reports generalized full body/back pain which she relates to polymyalgia rheumatica. Denies left flank/abdominal pain, gross hematuria.      Pt is a former smoker, quit in . No known chemical exposures. No personal or family hx of renal or bladder cancer. Patient's mother  of uterine cancer.      Son is present in room.     Assessment:      Pressure Injury Location: coccyx        Last photo:   Wound type: Pressure Injury     Pressure Injury Stage: 3, present on admission   Wound history/plan of care:   patient stated that she has lost over 100 lbs and has a \"long and pointy\" tailbone.     Wound base: 40 % fibrin, 60 % non-granular tissue     Palpation of the wound bed: normal      Drainage: scant     Description of drainage: serosanguinous     Measurements (length x width x depth, in cm) 2  x 1.4  x  0.4 cm      Tunneling N/A     Undermining N/A  Periwound skin: Scar tissue and 3 additional small openings to the left of the " "coccyx wound with similar characteristics. Scar tissue noted to margins indicating a chronic wound.       Color: pink      Temperature: normal   Odor: none  Pain: Irritable or crying out at intervals, facial expression of distress, and during dressing change, throbbing and shooting  Pain intervention prior to dressing change: slow and gentle cares   Treatment goal: Heal , Increase granulation, Protection, Promote epidermal migration, Remove necrotic tissue, and Soften necrotic tissue  STATUS: initial assessment  Supplies ordered: gathered    My PI Risk Assessment     Sensory Perception: 3 - Slightly Limited     Moisture: 1 - Constantly moist     Activity: 1 - Bedfast      Mobility: 2 - Very limited     Nutrition: 2 - Probably inadequate      Friction/Shear: 2 - Potential problem      TOTAL: 11     Treatment Plan:     Coccyx  May change mepilex dressing if soiled. Leave duoderm in place.   Notify WOC if soiled. Dressing trial under the duoderm.    Pressure Injury Prevention (PIP) Plan:  If patient is declining pressure injury prevention interventions: Explore reason why and address patient's concerns, Educate on pressure injury risk and prevention intervention(s), If patient is still declining, document \"informed refusal\" , and Ensure Care team is aware ( provider, charge nurse, etc)  Mattress: Follow bed algorithm, reassess daily and order specialty mattress, if indicated.  HOB: Maintain at or below 30 degrees, unless contraindicated  Repositioning in bed: Every 1-2 hours , Left/right positioning; avoid supine, Raise foot of bed prior to raising head of bed, to reduce patient sliding down (shear), and Frequent microturns using TAPS wedges, as patient tolerates  Heels: Keep elevated off mattress and Pillows under calves  Protective Dressing: Sacral Mepilex for prevention (#051177),  especially for the agitated patient   Positioning Equipment: None  Chair positioning: Chair cushion (#[id number])    If patient has a " buttock pressure injury, or high risk for PI use chair cushion or SPS.  Moisture Management: Perineal cleansing /protection: Follow Incontinence Protocol, Avoid brief in bed, Clean and dry skin folds with bathing , Consider InterDry (#650425) between folds, and Moisturize dry skin  Under Devices: Inspect skin under all medical devices during skin inspection , Ensure tubes are stabilized without tension, and Ensure patient is not lying on medical devices or equipment when repositioned  Ask provider to discontinue device when no longer needed.    Orders: Written    RECOMMEND PRIMARY TEAM ORDER: None, at this time  Education provided: importance of repositioning, wound progress, Moisture management, and Off-loading pressure  Discussed plan of care with: Patient and Nurse  WOC nurse follow-up plan: twice weekly  Notify WOC if wound(s) deteriorate.  Nursing to notify the Provider(s) and re-consult the WO Nurse if new skin concern.    DATA:     Current support surface: Standard  ED cart  Containment of urine/stool: Incontinence Protocol  BMI: Body mass index is 23.03 kg/m .   Active diet order: Orders Placed This Encounter      Regular Diet Adult     Output: I/O last 3 completed shifts:  In: 349.95 [P.O.:250; I.V.:99.95]  Out: -      Labs:   Recent Labs   Lab 01/18/24  0611 01/17/24  1843 01/17/24  1426   ALBUMIN  --   --  3.0*   HGB 10.8*   < > 13.6   WBC 8.3   < > 10.9    < > = values in this interval not displayed.     Pressure injury risk assessment:   Sensory Perception: 4-->no impairment  Moisture: 4-->rarely moist  Activity: 2-->chairfast  Mobility: 3-->slightly limited  Nutrition: 2-->probably inadequate  Friction and Shear: 2-->potential problem  Raj Score: 17    JEFF Morris RN CWOCN  Pager no longer in use, please contact through [employer] group: [facility] [employer] [facility]

## 2024-01-19 ENCOUNTER — APPOINTMENT (OUTPATIENT)
Dept: PHYSICAL THERAPY | Facility: CLINIC | Age: 79
End: 2024-01-19
Payer: COMMERCIAL

## 2024-01-19 ENCOUNTER — APPOINTMENT (OUTPATIENT)
Dept: OCCUPATIONAL THERAPY | Facility: CLINIC | Age: 79
End: 2024-01-19
Payer: COMMERCIAL

## 2024-01-19 PROBLEM — N39.0 ACUTE UTI: Status: RESOLVED | Noted: 2024-01-17 | Resolved: 2024-01-19

## 2024-01-19 PROBLEM — R11.2 NAUSEA AND VOMITING, UNSPECIFIED VOMITING TYPE: Status: RESOLVED | Noted: 2024-01-17 | Resolved: 2024-01-19

## 2024-01-19 PROCEDURE — 97116 GAIT TRAINING THERAPY: CPT | Mod: GP

## 2024-01-19 PROCEDURE — 250N000012 HC RX MED GY IP 250 OP 636 PS 637

## 2024-01-19 PROCEDURE — 250N000013 HC RX MED GY IP 250 OP 250 PS 637

## 2024-01-19 PROCEDURE — 97535 SELF CARE MNGMENT TRAINING: CPT | Mod: GO

## 2024-01-19 PROCEDURE — G0378 HOSPITAL OBSERVATION PER HR: HCPCS

## 2024-01-19 PROCEDURE — 97165 OT EVAL LOW COMPLEX 30 MIN: CPT | Mod: GO

## 2024-01-19 PROCEDURE — 99232 SBSQ HOSP IP/OBS MODERATE 35: CPT | Mod: GC

## 2024-01-19 PROCEDURE — 97112 NEUROMUSCULAR REEDUCATION: CPT | Mod: GP

## 2024-01-19 PROCEDURE — G0463 HOSPITAL OUTPT CLINIC VISIT: HCPCS

## 2024-01-19 RX ADMIN — ACETAMINOPHEN 650 MG: 325 TABLET ORAL at 21:42

## 2024-01-19 RX ADMIN — PREDNISONE 20 MG: 20 TABLET ORAL at 08:59

## 2024-01-19 RX ADMIN — ACETAMINOPHEN 650 MG: 325 TABLET ORAL at 04:38

## 2024-01-19 RX ADMIN — BRIMONIDINE TARTRATE 1 DROP: 2 SOLUTION/ DROPS OPHTHALMIC at 20:36

## 2024-01-19 RX ADMIN — BRIMONIDINE TARTRATE 1 DROP: 2 SOLUTION/ DROPS OPHTHALMIC at 08:59

## 2024-01-19 RX ADMIN — RIVAROXABAN 15 MG: 15 TABLET, FILM COATED ORAL at 17:27

## 2024-01-19 RX ADMIN — ACETAMINOPHEN 650 MG: 325 TABLET ORAL at 11:51

## 2024-01-19 RX ADMIN — RIVAROXABAN 15 MG: 15 TABLET, FILM COATED ORAL at 08:58

## 2024-01-19 RX ADMIN — ACETAMINOPHEN 650 MG: 325 TABLET ORAL at 16:19

## 2024-01-19 RX ADMIN — Medication 1 MG: at 21:42

## 2024-01-19 RX ADMIN — PRAVASTATIN SODIUM 80 MG: 20 TABLET ORAL at 21:42

## 2024-01-19 ASSESSMENT — ACTIVITIES OF DAILY LIVING (ADL)
ADLS_ACUITY_SCORE: 38

## 2024-01-19 NOTE — PROVIDER NOTIFICATION
GA: pt HR 48, BP 89/51 & 95/51. Pt asymptomatic. Notifying per orders.     BFM: continue to monitor, no new orders since pt is currently asymptomatic

## 2024-01-19 NOTE — DISCHARGE SUMMARY
[facility]  Discharge Summary - Medicine & Pediatrics       Date of Admission:  1/17/2024  Date of Discharge:  1/21/2024  Discharging Provider: Dr. Precious Dumont  Discharge Service: Hospitalist Service    Discharge Diagnoses   Principal Problem:    Bilateral pulmonary embolism (H) (1/17/2024)  Active Problems:    Malignant neoplasm of kidney, unspecified laterality (H) (1/17/2024)    Pressure injury of coccygeal region, stage 3 (H) (1/21/2024)    Pulmonary nodule, right (1/21/2024)    Drug-induced constipation (1/21/2024)      Clinically Significant Risk Factors      Smoking history    Follow-ups Needed After Discharge   Follow-up Appointments     Follow-up and recommended labs and tests       [facility] will call you to arrange follow up with Dr. Brewer in 2-3   weeks for left renal mass. You may call to confirm appointment as needed   ([phone]).          Discharge Disposition   Discharged to transitional care unit.  Condition at discharge: Stable    Hospital Course   Chelsea Peacock was admitted on 1/17/2024 for bilateral pulmonary emboli and new left renal mass.  The following problems were addressed during her hospitalization:    Bilateral PE  CT chest showing acute bilateral PE without evidence of right heart strain. Patient reporting decreased activity level but also concern for renal cell carcinoma on CT abdomen so provoked may be likely. US BLE to eval DVT - Nonocculsive DVT left posterior tibial vein within mid calf. Has not oxygen requirements. Initially on heparin drip transitioned to Xarelto. Xarelto 15mg BID x21 days followed by 20mg daily indefinitely.      Left Renal Mass  Pulmonary nodule   CT abdomen/pelvis showing new left renal mass suspicious for renal cell carcinoma. She does endorse weakness, poor appetite, weight loss, and presents with bilateral PE so malignancy does seem likely however could also be benign pathology. Urology recommending outpatient follow-up.  Follow-up with hematology/oncology outpatient, referral order placed.     Asymptomatic Bradycardia   Patient has been bradycardic this admission. No recorded bradycardia on chart review. Her PTA BP meds included lisinopril, Metoprolol and lasix. Hold parameters placed for Iinisopril and metoprolol. She has not received lasix since admission. EKG showed sinus bradycardia. Electrolytes were wnl. Cardiology was consulted, TSH wnl. On discharge, metoprolol and lisinopril held.     Acute Cystitis   UA with +leuk esterase, WBC. Patient asymptomatic, not complaining of any burning with urination.  Urine culture showed <50,000: Proteus mirabilis.      Sacral Wound  Not examined as patient was lying in hallway ED bed. Reports she recently completed course of Keflex but did not finish last 2 days due to nausea. [facility] nurse consulted.     Generalized Weakness  FTT  Has been ongoing for 2-3 months. Likely related to above diagnosis. Also unclear on how long she has been on steroids and when stopped. Concern for component of adrenal insufficiency. Prednisone 20mg PO daily, PT/OT consulted.     Polymyalgia Rheumatica  Had been on steroid taper since 11/21. Prednisone 25 mg ddaily x 28 days then 20 mg daily for 21 days. Patient reports she completed. I see a note from 1/11 recommending she start 20 mg daily again for ongoing pain symptoms. We will initiate the recommended dose for now and see if her weakness improves. Prednisone 20 mg daily.     Chronic Conditions:  HTN: Holding Lisinopril and metoprolol for soft Blood pressures and bradycardia.  Osteoporosis: Hold PTA risedronate   HLD:  Continue PTA Pravastatin      Consultations This Hospital Stay   PHARMACY IP CONSULT  PHYSICAL THERAPY ADULT IP CONSULT  OCCUPATIONAL THERAPY ADULT IP CONSULT  WOUND OSTOMY CONTINENCE NURSE  IP CONSULT  CARE MANAGEMENT / SOCIAL WORK IP CONSULT  PHARMACY TEST CLAIM IP CONSULT  UROLOGY IP CONSULT  HEMATOLOGY & ONCOLOGY IP CONSULT  CARE MANAGEMENT /  SOCIAL WORK IP CONSULT  CARDIOLOGY IP CONSULT    Code Status   No CPR- Do NOT Intubate       The patient was discussed with Dr. Precious Dumont.    Molina Perez DO  [address] [address]  Phone: [phone]  Fax: [phone]  ______________________________________________________________________    Physical Exam   Vital Signs: Temp: 97.5  F (36.4  C) Temp src: Oral BP: 104/53 Pulse: (!) 49   Resp: 18 SpO2: 94 % O2 Device: None (Room air)    Weight: 130 lbs 9.6 oz  Constitutional: awake, alert, cooperative, no apparent distress  Eyes: Lids and lashes normal, pupils equal, sclera clear, conjunctiva normal  ENT: Normocephalic, without obvious abnormality, atraumatic, external ears without lesions, oral pharynx with moist mucous membranes  Respiratory: No increased work of breathing, good air exchange   Cardiovascular: Regular rate and rhythm, no murmur noted  GI: Bowel sounds present, abdomen soft, non-distended, non-tender, no masses palpated, no hepatosplenomegaly  Skin: no bruising or bleeding and normal skin color, texture, turgor on exposed skin. Stage 3 sacral ulcer present on admission, see image below. Granulation tissue, no purulence noted. B/l upper and lower extremity lipoedema   Psych: Appropriate mood and affect          Primary Care Physician   Cj Broussard    Discharge Orders      Adult Oncology/Hematology  Referral      Follow-up and recommended labs and tests     [facility] will call you to arrange follow up with Dr. Brewer in 2-3 weeks for left renal mass. You may call to confirm appointment as needed ([phone]).       Significant Results and Procedures   Most Recent 3 CBC's:  Recent Labs   Lab Test 01/18/24  0611 01/17/24  1843 01/17/24  1426   WBC 8.3 10.0 10.9   HGB 10.8* 11.3* 13.6   MCV 88 88 88    193 254     Most Recent 3 BMP's:  Recent Labs   Lab Test 01/20/24  1139 01/18/24  0611 01/17/24  1426     135 140   POTASSIUM 4.4 4.5 4.3   CHLORIDE 106 104 106   CO2 25 25 25   BUN 23.9* 30.3* 32.5*   CR 0.87 1.25* 1.14*   ANIONGAP 6* 6* 9   TRACEY 8.2* 8.4* 9.0   * 87 95     Most Recent 2 LFT's:  Recent Labs   Lab Test 01/17/24  1426 01/04/24  1144 11/17/23  1430   AST 30 27 28   ALT 7  --  6   ALKPHOS 56  --  56   BILITOTAL 0.9  --  0.4     Most Recent Urinalysis:  Recent Labs   Lab Test 01/17/24  1540   COLOR Yellow   APPEARANCE Turbid*   URINEGLC Negative   URINEBILI Negative   URINEKETONE Negative   SG 1.027   UBLD Negative   URINEPH 5.0   PROTEIN 20*   NITRITE Negative   LEUKEST 250 Destiny/uL*   RBCU 3*   WBCU 54*   ,   Results for orders placed or performed during the hospital encounter of 01/17/24   CT Chest/Abdomen/Pelvis w Contrast    Narrative    EXAM: CT CHEST/ABDOMEN/PELVIS W CONTRAST  LOCATION: [facility]  DATE: 1/17/2024    INDICATION: Nausea, productive cough, lower abdominal pain  COMPARISON: None.  TECHNIQUE: CT scan of the chest, abdomen, and pelvis was performed following injection of IV contrast. Multiplanar reformats were obtained. Dose reduction techniques were used.   CONTRAST: 75ml Isovue 370    FINDINGS:   LUNGS AND PLEURA: Moderate centrilobular and paraseptal emphysema. Mild dependent atelectasis bilaterally. 0.4 cm round, noncalcified right lower lobe pulmonary nodule (series 4, image 114). Calcified granulomas left lower lobe.     MEDIASTINUM/AXILLAE: Acute-appearing pulmonary emboli involving the right main pulmonary artery and lobar, segmental, and subsegmental arterial branches in the right upper, right middle, and right lower lobes and segmental branches in the left lower   lobe. No evidence of right heart strain. Normal variant direct origin of the left vertebral artery from the aortic arch.     CORONARY ARTERY CALCIFICATION: Mild calcified atherosclerosis left anterior descending coronary artery.     HEPATOBILIARY: Normal.    PANCREAS:  Normal.    SPLEEN: Normal.    ADRENAL GLANDS: Normal.    KIDNEYS/BLADDER: 2.7 x 2.6 cm solid, enhancing mass in the mid left kidney (series 3, image 166). No violation of the pararenal fascia. No left renal vein involvement. Tiny right kidney cyst, requiring no follow-up.     BOWEL: Normal, including the appendix.     LYMPH NODES: Normal.    VASCULATURE: Moderate atherosclerosis.     PELVIC ORGANS: Pelvic phleboliths. Tiny calcified uterine fibroids. 3.4 cm left ovarian cyst.     MUSCULOSKELETAL: Mild degenerative change in the spine. Slight anterolisthesis of L4 on L5.       Impression    IMPRESSION:  1.  Acute-appearing bilateral pulmonary emboli. No pulmonary infarction or right heart strain.  2.  2.7 cm solid left kidney mass represents renal cell carcinoma until proven otherwise. 4 mm right lower lobe pulmonary nodule could represent a metastasis, but is nonspecific and benign etiologies are in the differential. Recommend urology   consultation when clinically feasible.     Discussed by telephone with Dr. Vásquez at 6:18 PM on 01/17/2024.    US Lower Extremity Venous Duplex Bilateral    Addendum: 1/18/2024    Addendum: Results given to Dr. Roxanna Mccabe at 2252      Narrative    EXAM: US LOWER EXTREMITY VENOUS DUPLEX BILATERAL  LOCATION: [facility]  DATE: 1/17/2024    INDICATION: eval dvt, has PEs  COMPARISON: None.  TECHNIQUE: Venous Duplex ultrasound of bilateral lower extremities with and without compression, augmentation and duplex. Color flow and spectral Doppler with waveform analysis performed.    FINDINGS: Exam includes the common femoral, femoral, popliteal veins as well as segmentally visualized deep calf veins and greater saphenous vein.     RIGHT: No deep vein thrombosis. No superficial thrombophlebitis. No popliteal cyst.    LEFT: 3 cm nonocclusive thrombus left posterior tibial vein in the mid calf. No superficial thrombophlebitis. No popliteal cyst.      Impression     IMPRESSION:  1.  Nonocclusive DVT left posterior tibial vein within the mid calf.  2.  Negative for DVT right lower extremity.       Discharge Medications   Current Discharge Medication List        START taking these medications    Details   cefdinir (OMNICEF) 300 MG capsule Take 1 capsule (300 mg) by mouth 2 times daily for 7 days  Qty: 14 capsule, Refills: 0           CONTINUE these medications which have NOT CHANGED    Details   brimonidine (ALPHAGAN) 0.2 % ophthalmic solution Place 1 drop into both eyes 2 times daily      calcium carbonate-vitamin D (OSCAL) 500-5 MG-MCG tablet Take 1 tablet by mouth 2 times daily  Qty: 180 tablet, Refills: 1    Comments: If not covered, please direct patient to OTC equivalent please  Associated Diagnoses: Osteoporosis, idiopathic      cholecalciferol, vitamin D3, 1,000 unit tablet [CHOLECALCIFEROL, VITAMIN D3, 1,000 UNIT TABLET] Take 2,000 Units by mouth daily.      cyanocobalamin 1000 MCG tablet [CYANOCOBALAMIN 1000 MCG TABLET] Take 2,500 mcg by mouth daily.      furosemide (LASIX) 20 MG tablet Take 20 mg by mouth daily      glucosamine sulfate (GLUCOSAMINE) 500 mg Tab [GLUCOSAMINE SULFATE (GLUCOSAMINE) 500 MG TAB] Take 1,500 mg by mouth daily.      lisinopril (PRINIVIL,ZESTRIL) 40 MG tablet [LISINOPRIL (PRINIVIL,ZESTRIL) 40 MG TABLET] Take 40 mg by mouth daily.      metoprolol succinate ER (TOPROL XL) 25 MG 24 hr tablet Take 25 mg by mouth daily      Omega-3 Fatty Acids (FISH OIL) 1200 MG capsule Take 1,200 mg by mouth daily      potassium chloride SA (K-DUR,KLOR-CON) 10 MEQ tablet [POTASSIUM CHLORIDE SA (K-DUR,KLOR-CON) 10 MEQ TABLET] Take 10 mEq by mouth daily.      pravastatin (PRAVACHOL) 80 MG tablet [PRAVASTATIN (PRAVACHOL) 80 MG TABLET] Take 80 mg by mouth bedtime.      therapeutic multivitamin (THERAGRAN) tablet [THERAPEUTIC MULTIVITAMIN (THERAGRAN) TABLET] Take 1 tablet by mouth daily.      RISEdronate (ACTONEL) 35 MG tablet Take 1 tablet (35 mg) by mouth every 7 days  Take 30 minutes before any food, drink, or other medications. Remain upright for 30 minutes after taking.  Qty: 12 tablet, Refills: 1    Associated Diagnoses: Osteoporosis, idiopathic           Allergies   Allergies   Allergen Reactions    Cortisone Itching    Sulfa (Sulfonamide Antibiotics) [Sulfa Antibiotics] Itching

## 2024-01-19 NOTE — PLAN OF CARE
"PRIMARY DIAGNOSIS: \"GENERIC\" NURSING  OUTPATIENT/OBSERVATION GOALS TO BE MET BEFORE DISCHARGE:  ADLs back to baseline: No    Activity and level of assistance: Up with standby assistance.    Pain status: Improved-controlled with oral pain medications.    Return to near baseline physical activity: No     Discharge Planner Nurse   Safe discharge environment identified: No  Barriers to discharge: Yes       Entered by: Kailey Calderón RN 01/19/2024 4:52 AM     Please review provider order for any additional goals.   Nurse to notify provider when observation goals have been met and patient is ready for discharge.    Pt is A&Ox4 but can be forgetful. Pt rated pain 2-3/10 during shift thus far. Managed with prn medications, repositioning, and rest. Mepilex in place on coccyx. SBA for transferring. Saline locked. Voiding adequately. Education on medication administration, alarms, and use of call-light to reduce risk for falls and injury. VSS other than bradycardic and soft bp at midnight, BP WDL by 0400. Pt denies chest pain, shortness of breath, and nausea.     "

## 2024-01-19 NOTE — PROGRESS NOTES
[facility]    Progress Note - Hospitalist Service       Date of Admission:  1/17/2024    Assessment & Plan   Chelsea Peacock is a 78 year old female admitted on 1/17/2024. She has a history of PMR, Osteoporosis, HTN, HLD and is admitted for bilateral pulmonary emboli and new left renal mass.     Dispo planning:  Both nurse and BFM team feel strongly patient would benefit from TCU placement. Patient requiring assistance to stand as well as seems to fatigue easily. PT recs from yesterday's note indicate TCU a reasonable option. Patient and family also feel she would benefit from aid at TCU.    Bilateral PE  CT chest showing acute bilateral PE without evidence of right heart strain. Patient reporting decreased activity level but also concern for renal cell carcinoma on CT abdomen so provoked may be likely. US BLE to eval DVT - Nonocculsive DVT left posterior tibial vein within mid calf.  Heparin infusion discontinued - begin therapy with Xarelto:  Xarelto 15mg BID x21 days followed by 20mg daily     Left Renal Mass  Pulmonary nodule   CT abdomen/pelvis showing new left renal mass suspicious for renal cell carcinoma. She does endorse weakness, poor appetite, weight loss, and presents with bilateral PE so malignancy does seem likely however could also be benign pathology. Urology recommending outpatient follow-up.  Follow-up with hematology/oncology outpatient     Acute Cystitis   UA with +leuk esterase, WBC.  Ceftriaxone IV discontinued, patient asymptomatic and not complaining of any burning with urination  Follow UC  Urology signed off, recommending outpatient follow-up.     Sacral Wound  Not examined as patient was lying in hallway ED bed. Reports she recently completed course of Keflex but did not finish last 2 days due to nausea.   WOC consult - see note     Generalized Weakness  FTT  Has been ongoing for 2-3 months. Likely related to above diagnosis. Also unclear on how long she has been  on steroids and when stopped. Concern for component of adrenal insufficiency.  Manage per above  Start Prednisone 20mg PO daily  PT/OT consult      Polymyalgia Rheumatica  Had been on steroid taper since 11/21. Prednisone 25 mg ddaily x 28 days then 20 mg daily for 21 days. Patient reports she completed. I see a note from 1/11 recommending she start 20 mg daily again for ongoing pain symptoms. We will initiate the recommended dose for now and see if her weakness improves.   Prednisone 20 mg daily  Acetaminophen PRN for pain        Chronic Conditions:  HTN: Continue PTA lisinopril, Metoprolol with hold parameters. Patient report she is not taking Lasix so will hold.   Osteoporosis: Hold PTA risedronate   HLD:  Continue PTA Pravastatin       Observation Goals: -diagnostic tests and consults completed and resulted, -vital signs normal or at patient baseline, -tolerating oral intake to maintain hydration, -adequate pain control on oral analgesics, -returns to baseline functional status, -safe disposition plan has been identified, Nurse to notify provider when observation goals have been met and patient is ready for discharge.  Diet: Regular Diet Adult    DVT Prophylaxis: DOAC  Blanc Catheter: Not present  Fluids: PO  Lines: None     Cardiac Monitoring: None  Code Status: No CPR- Do NOT Intubate      Clinically Significant Risk Factors Present on Admission              # Hypoalbuminemia: Lowest albumin = 3 g/dL at 1/17/2024  2:26 PM, will monitor as appropriate     # Hypertension: Noted on problem list            # Financial/Environmental Concerns: none         Disposition Plan      Expected Discharge Date: 01/20/2024    Discharge Delays: *Medically Ready for Discharge  Placement - TCU  Destination: nursing home (For Transitional Care services)          The patient's care was discussed with the Attending Physician, Dr. Precious Dumont .    Molina Perez DO  Hospitalist Service  [facility]  [facility]  Securely message with [employer] (more info)  Text page via [facility] Paging/Directory   ______________________________________________________________________    Interval History   Patient resting in bed comfortably, complained of pain at site of sacral ulcer. Oxycodone 2.5 overnight provided significant pain relief. Otherwise patient stated she has not been eating much lately due to her discomfort and was happy to have a normal breakfast this morning.    Physical Exam   Vital Signs: Temp: 97.6  F (36.4  C) Temp src: Oral BP: 118/58 Pulse: (!) 46   Resp: 16 SpO2: 98 % O2 Device: None (Room air)    Weight: 130 lbs 0 oz    Constitutional: awake, alert, cooperative, no apparent distress  Eyes: Lids and lashes normal, pupils equal, sclera clear, conjunctiva normal  ENT: Normocephalic, without obvious abnormality, atraumatic, external ears without lesions, oral pharynx with moist mucous membranes  Respiratory: No increased work of breathing, good air exchange, clear to auscultation bilaterally, no crackles or wheezing  Cardiovascular: Regular rate and rhythm, no murmur noted  GI: Bowel sounds present, abdomen soft, non-distended, non-tender, no masses palpated, no hepatosplenomegaly  Skin: Stage 3 sacral ulcer, see image below. Granulation tissue, no purulence noted. B/l upper and lower extremity lipoedema  Psych: Appropriate mood and affect      1/19:      Medical Decision Making       Please see A&P for additional details of medical decision making.      Data   ------------------------- PAST 24 HR DATA REVIEWED -----------------------------------------------        Imaging results reviewed over the past 24 hrs:   No results found for this or any previous visit (from the past 24 hour(s)).

## 2024-01-19 NOTE — PROGRESS NOTES
Care Management Follow Up    Length of Stay (days): 0    Expected Discharge Date: 01/20/2024     Concerns to be Addressed: care coordination/care conferences, discharge planning     Patient plan of care discussed at interdisciplinary rounds: Yes    Anticipated Discharge Disposition: Transitional Care     Anticipated Discharge Services: None  Anticipated Discharge DME: Other (see comment) (TBD further)    Patient/family educated on Medicare website which has current facility and service quality ratings: yes  Education Provided on the Discharge Plan: Yes (Assessment and service coordination process explained)  Patient/Family in Agreement with the Plan: yes    Referrals Placed by CM/ARPITA:  (Not yet indicated)  Private pay costs discussed: Not applicable    Additional Information:  1:57 PM  SW spoke with admissions at [facility].  They will submit for insurance prior auth.  CM can follow up this weekend to check on status but do not anticipate response until Monday.    MINA Benjamin

## 2024-01-19 NOTE — PROGRESS NOTES
"[facility] Nurse Inpatient Assessment     Consulted for: sacral wound    Summary: patient stated she has been dealing with this wound since 2023    Patient History (according to provider note(s):      Chelsea Peacock is a 78 year old female with hx of osteoporosis, polymyalgia rheumatica, GERD, HTN, HLD; Admitted through the ED  with c/o nausea x 4 weeks, vomiting x 3 days, slightly productive cough and non-healing tailbone ulcer and diagnosed with bilateral PE and new left renal mass on CT . Urology was consulted for left renal mass. History is obtained from patient and chart review.      CT Chest/Abd/Pelvis (with contrast) 24 showed acute appearing bilateral PE, no pulmonary infarction or right heart strain, a 2.7 cm solid left kidney mass and a 4 mm RLL pulmonary nodule.      Labs : UA  mildly concerning for infection vs contamination. UC pending. WBC 10.9. Hgb 13.6. Creatinine 1.14 (base appears 1.03-1.44).      Pt reports generalized full body/back pain which she relates to polymyalgia rheumatica. Denies left flank/abdominal pain, gross hematuria.      Pt is a former smoker, quit in . No known chemical exposures. No personal or family hx of renal or bladder cancer. Patient's mother  of uterine cancer.      Son is present in room.     Assessment:      Pressure Injury Location: coccyx          Last photo:   Wound type: Pressure Injury     Pressure Injury Stage: 3, present on admission   Wound history/plan of care:   patient stated that she has lost over 100 lbs and has a \"long and pointy\" tailbone.     Wound base: 40 % slough, 60 % non-granular tissue, nonviable tissue softening     Palpation of the wound bed: normal      Drainage: scant     Description of drainage: serosanguinous     Measurements (length x width x depth, in cm) 2  x 1.4  x  0.4 cm      Tunneling " "N/A     Undermining N/A  Periwound skin: Scar tissue and 3 additional small openings to the left of the coccyx wound with similar characteristics. Scar tissue noted to margins indicating a chronic wound.       Color: pink      Temperature: normal   Odor: none  Pain: Irritable or crying out at intervals, facial expression of distress, and during dressing change, throbbing and shooting - patient was fine before palpation and assessment. Working out if the after pain is from palpation or the dressing itself, notified bedside RN for some tylenol.   Pain intervention prior to dressing change: slow and gentle cares   Treatment goal: Heal , Increase granulation, Protection, Promote epidermal migration, Remove necrotic tissue, and Soften necrotic tissue  STATUS: evolving  Supplies ordered: gathered    My PI Risk Assessment     Sensory Perception: 3 - Slightly Limited     Moisture: 1 - Constantly moist     Activity: 1 - Bedfast      Mobility: 2 - Very limited     Nutrition: 2 - Probably inadequate      Friction/Shear: 2 - Potential problem      TOTAL: 11     Treatment Plan:     Coccyx - urgo clean ag trial  Soak wound with vashe moistened gauze for 5 minutes, pat dry  Cut a small piece of urgo clean ag (green package at bedside) to cover larger coccyx wound  Place sticky side to wound base  Change every MWF + PRN if soiled, saturated, falls off    Pressure Injury Prevention (PIP) Plan:  If patient is declining pressure injury prevention interventions: Explore reason why and address patient's concerns, Educate on pressure injury risk and prevention intervention(s), If patient is still declining, document \"informed refusal\" , and Ensure Care team is aware ( provider, charge nurse, etc)  Mattress: Follow bed algorithm, reassess daily and order specialty mattress, if indicated.  HOB: Maintain at or below 30 degrees, unless contraindicated  Repositioning in bed: Every 1-2 hours , Left/right positioning; avoid supine, Raise foot " of bed prior to raising head of bed, to reduce patient sliding down (shear), and Frequent microturns using TAPS wedges, as patient tolerates  Heels: Keep elevated off mattress and Pillows under calves  Protective Dressing: Sacral Mepilex for prevention (#500319),  especially for the agitated patient   Positioning Equipment: None  Chair positioning: Chair cushion (#[id number])    If patient has a buttock pressure injury, or high risk for PI use chair cushion or SPS.  Moisture Management: Perineal cleansing /protection: Follow Incontinence Protocol, Avoid brief in bed, Clean and dry skin folds with bathing , Consider InterDry (#[id number]) between folds, and Moisturize dry skin  Under Devices: Inspect skin under all medical devices during skin inspection , Ensure tubes are stabilized without tension, and Ensure patient is not lying on medical devices or equipment when repositioned  Ask provider to discontinue device when no longer needed.    Orders: Written    RECOMMEND PRIMARY TEAM ORDER: None, at this time  Education provided: importance of repositioning, wound progress, Moisture management, and Off-loading pressure  Discussed plan of care with: Patient and Nurse  WOC nurse follow-up plan: twice weekly  Notify WOC if wound(s) deteriorate.  Nursing to notify the Provider(s) and re-consult the WOC Nurse if new skin concern.    DATA:     Current support surface: Standard  ED cart  Containment of urine/stool: Incontinence Protocol  BMI: Body mass index is 23.03 kg/m .   Active diet order: Orders Placed This Encounter      Regular Diet Adult     Output: I/O last 3 completed shifts:  In: 22.83 [I.V.:22.83]  Out: -      Labs:   Recent Labs   Lab 01/18/24  0611 01/17/24  1843 01/17/24  1426   ALBUMIN  --   --  3.0*   HGB 10.8*   < > 13.6   WBC 8.3   < > 10.9    < > = values in this interval not displayed.     Pressure injury risk assessment:   Sensory Perception: 4-->no impairment  Moisture: 4-->rarely moist  Activity: 3-->walks  occasionally  Mobility: 3-->slightly limited  Nutrition: 2-->probably inadequate  Friction and Shear: 2-->potential problem  Raj Score: 18    JEFF Morris RN CWOCN  Pager no longer in use, please contact through [employer] group: [facility] [employer] [facility]

## 2024-01-19 NOTE — PROGRESS NOTES
"PRIMARY DIAGNOSIS: \"GENERIC\" NURSING  OUTPATIENT/OBSERVATION GOALS TO BE MET BEFORE DISCHARGE:  ADLs back to baseline: No    Activity and level of assistance: Up with standby assistance.    Pain status: Improved-controlled with oral pain medications.    Return to near baseline physical activity: No     Awaiting TCU placement. Sacral wound covered with WOC placed dressing, CDI. Family at bedside. Xa levels critical > 1.10 paged to FYI residents, heparin drip to remain discontinued. Vitally stable on RA. Calls appropriately to make needs known.   "

## 2024-01-19 NOTE — UTILIZATION REVIEW
Continued stay Observation     Concurrent stay review; Secondary Review Determination         Under the authority of the Utilization Management Committee, the utilization review process indicated a secondary review on the above patient.  The review outcome is based on review of the medical records, discussions with staff, and applying clinical experience noted on the date of the review.          (x) Observation Status Appropriate - Concurrent stay review    RATIONALE FOR DETERMINATION   Chelsea Peacock is a 78 year old female with history of PMR, Osteoporosis, HTN, HLD and is admitted for bilateral pulmonary emboli and new left renal mass.  Patient currently on Xarelto.      Patient is clinically improving and there is no clear indication to change patient's status to inpatient. The severity of illness, intensity of service provided, expected LOS and risk for adverse outcome make the care appropriate for observation.    The information on this document is developed by the utilization review team in order for the business office to ensure compliance.  This only denotes the appropriateness of proper admission status and does not reflect the quality of care rendered.         The definitions of Inpatient Status and Observation Status used in making the determination above are those provided in the CMS Coverage Manual, Chapter 1 and Chapter 6, section 70.4.      Sincerely,   Tayo Uribe MD    Utilization Review  Physician Advisor  [facility].

## 2024-01-19 NOTE — PLAN OF CARE
"PRIMARY DIAGNOSIS: \"GENERIC\" NURSING  OUTPATIENT/OBSERVATION GOALS TO BE MET BEFORE DISCHARGE:  ADLs back to baseline: No    Activity and level of assistance: Up with standby assistance.    Pain status: Improved-controlled with oral pain medications.    Return to near baseline physical activity: No     Discharge Planner Nurse   Safe discharge environment identified: No  Barriers to discharge: Yes       Entered by: Kailey Calderón RN 01/19/2024 2:15 AM     Please review provider order for any additional goals.   Nurse to notify provider when observation goals have been met and patient is ready for discharge.  "

## 2024-01-19 NOTE — PLAN OF CARE
Pt has been ambualting with standby assistance throughout the day but does fatigue quickly. She independently turns/shifts weight on her coccyx and is well supported with pillows on bony prominences. Pain has been well managed with PRN medication. She calls appropriately. Bed in lowest position, call light within reach, and bed alarm armed.         Goal Outcome Evaluation:         Problem: Adult Inpatient Plan of Care  Goal: Absence of Hospital-Acquired Illness or Injury  Outcome: Progressing  Intervention: Identify and Manage Fall Risk  Recent Flowsheet Documentation  Taken 1/19/2024 1707 by Lilian Franklin RN  Safety Promotion/Fall Prevention:   activity supervised   assistive device/personal items within reach   clutter free environment maintained   increased rounding and observation   lighting adjusted   mobility aid in reach   nonskid shoes/slippers when out of bed   patient and family education   room door open   room near nurse's station   room organization consistent   safety round/check completed  Taken 1/19/2024 1020 by Lilian Franklin RN  Safety Promotion/Fall Prevention:   activity supervised   assistive device/personal items within reach   clutter free environment maintained   increased rounding and observation   lighting adjusted   mobility aid in reach   nonskid shoes/slippers when out of bed   patient and family education   room door open   room near nurse's station   room organization consistent   safety round/check completed  Intervention: Prevent Infection  Recent Flowsheet Documentation  Taken 1/19/2024 1707 by Lilian Franklin RN  Infection Prevention:   single patient room provided   rest/sleep promoted   personal protective equipment utilized   hand hygiene promoted   equipment surfaces disinfected  Taken 1/19/2024 1020 by Lilian Franklin RN  Infection Prevention:   single patient room provided   rest/sleep promoted   personal protective equipment utilized   hand hygiene promoted   equipment  surfaces disinfected  Goal: Optimal Comfort and Wellbeing  Outcome: Progressing  Goal: Readiness for Transition of Care  Outcome: Progressing     Problem: Pain Acute  Goal: Optimal Pain Control and Function  Outcome: Progressing  Intervention: Prevent or Manage Pain  Recent Flowsheet Documentation  Taken 1/19/2024 1707 by Lilian Franklin RN  Medication Review/Management: medications reviewed  Taken 1/19/2024 1020 by Lilian Franklin RN  Medication Review/Management: medications reviewed     Problem: Fall Injury Risk  Goal: Absence of Fall and Fall-Related Injury  Outcome: Progressing  Intervention: Identify and Manage Contributors  Recent Flowsheet Documentation  Taken 1/19/2024 1707 by Lilian Franklin RN  Medication Review/Management: medications reviewed  Taken 1/19/2024 1020 by Lilian Franklin RN  Medication Review/Management: medications reviewed  Intervention: Promote Injury-Free Environment  Recent Flowsheet Documentation  Taken 1/19/2024 1707 by Lilian Franklin RN  Safety Promotion/Fall Prevention:   activity supervised   assistive device/personal items within reach   clutter free environment maintained   increased rounding and observation   lighting adjusted   mobility aid in reach   nonskid shoes/slippers when out of bed   patient and family education   room door open   room near nurse's station   room organization consistent   safety round/check completed  Taken 1/19/2024 1020 by Lilian Franklin RN  Safety Promotion/Fall Prevention:   activity supervised   assistive device/personal items within reach   clutter free environment maintained   increased rounding and observation   lighting adjusted   mobility aid in reach   nonskid shoes/slippers when out of bed   patient and family education   room door open   room near nurse's station   room organization consistent   safety round/check completed     Problem: Wound  Goal: Improved Oral Intake  Outcome: Progressing  Goal: Skin Health and Integrity  Outcome:  Progressing  Goal: Optimal Wound Healing  Outcome: Progressing

## 2024-01-19 NOTE — PLAN OF CARE
Problem: Pain Acute  Goal: Optimal Pain Control and Function  Outcome: Progressing  Intervention: Develop Pain Management Plan  Recent Flowsheet Documentation  Taken 1/18/2024 2039 by Socorro Bautista RN  Pain Management Interventions:   medication (see MAR)   rest   repositioned   relaxation techniques promoted  Intervention: Prevent or Manage Pain  Recent Flowsheet Documentation  Taken 1/18/2024 2039 by Socorro Bautista RN  Medication Review/Management: medications reviewed     Problem: Fall Injury Risk  Goal: Absence of Fall and Fall-Related Injury  Outcome: Progressing  Intervention: Identify and Manage Contributors  Recent Flowsheet Documentation  Taken 1/18/2024 2039 by Socorro Bautista RN  Medication Review/Management: medications reviewed  Intervention: Promote Injury-Free Environment  Recent Flowsheet Documentation  Taken 1/18/2024 2039 by Socorro Bautista RN  Safety Promotion/Fall Prevention:   activity supervised   assistive device/personal items within reach   clutter free environment maintained   increased rounding and observation   increase visualization of patient   lighting adjusted   mobility aid in reach   nonskid shoes/slippers when out of bed   room door open   room near nurse's station   room organization consistent   safety round/check completed   supervised activity     Problem: Wound  Goal: Optimal Functional Ability  Outcome: Progressing  Intervention: Optimize Functional Ability  Recent Flowsheet Documentation  Taken 1/18/2024 2039 by Socorro Bautista RN  Assistive Device Utilized:   walker   gait belt  Activity Management:   ambulated to bathroom   back to bed   activity adjusted per tolerance  Activity Assistance Provided: assistance, 1 person  Goal: Optimal Pain Control and Function  Outcome: Progressing  Intervention: Prevent or Manage Pain  Recent Flowsheet Documentation  Taken 1/18/2024 2039 by Socorro Bautista RN  Pain Management Interventions:   medication (see MAR)   rest    repositioned   relaxation techniques promoted   Goal Outcome Evaluation: vitally stable on room air. A/o x 4, but forgetful. Coccyx Pain being managed with PRN oxycodone. Mepilex dressing in place. Dressing pulled back for inspection, foam dressing to intergluteal cleft in place and being managed by WOC. No drainage noted. Patient turned on side with pillows for support to off load wound. Right PIV leaking, new right PIV placed by SWAT nurse. Patient states she has baseline numbing and tingling in hands. Trace edema noted in all extremities. Patient currently resting comfortably in bed.

## 2024-01-19 NOTE — PROGRESS NOTES
01/19/24 0930   Appointment Info   Signing Clinician's Name / Credentials (OT) Wilton Hogan OTR/L   Quick Adds   Quick Adds Certification   Living Environment   People in Home alone   Current Living Arrangements house   Living Environment Comments No AD. Pt has tub shower w/ shower chair, high toilet. Pt is moving to [facility] in 2 weeks.   Self-Care   Usual Activity Tolerance moderate   Current Activity Tolerance moderate   Equipment Currently Used at Home grab bar, tub/shower;raised toilet seat   Fall history within last six months no   Activity/Exercise/Self-Care Comment Pt typically IND w/ ADLs and some IADLs at baseline but pt's kids assist w/ shopping/driving/cleaning   General Information   Onset of Illness/Injury or Date of Surgery 01/17/24   Referring Physician Precious Dumont MD   Patient/Family Therapy Goal Statement (OT) get stronger before moving to [facility]   Additional Occupational Profile Info/Pertinent History of Current Problem Chelsea Peacock is a 78 year old female admitted on 1/17/2024. She has a history of PMR, Osteoporosis, HTN, HLD and is admitted for bilateral pulmonary emboli and new left renal mass.   Existing Precautions/Restrictions no known precautions/restrictions   Cognitive Status Examination   Affect/Mental Status (Cognitive) WFL   Visual Perception   Visual Impairment/Limitations WFL   Sensory   Sensory Quick Adds sensation intact   Pain Assessment   Patient Currently in Pain Yes, see Vital Sign flowsheet   Posture   Posture not impaired   Range of Motion Comprehensive   General Range of Motion no range of motion deficits identified   Strength Comprehensive (MMT)   Comment, General Manual Muscle Testing (MMT) Assessment mild weakness   Bed Mobility   Bed Mobility supine-sit   Comment (Bed Mobility) SBA   Transfers   Transfers toilet transfer;bed-chair transfer   Transfer Comments SBA   Activities of Daily Living   BADL Assessment/Intervention toileting;bathing   Bathing Assessment/Intervention    Hemlock Level (Bathing) supervision   Toileting   Hemlock Level (Toileting) supervision   Clinical Impression   Criteria for Skilled Therapeutic Interventions Met (OT) Yes, treatment indicated   OT Diagnosis decreased ADLs   Influenced by the following impairments malignant neoplasm of kidney   OT Problem List-Impairments impacting ADL activity tolerance impaired;strength;pain   Assessment of Occupational Performance 1-3 Performance Deficits   Identified Performance Deficits bed mobility, standing ADLs, bathing   Planned Therapy Interventions (OT) ADL retraining;bed mobility training;strengthening;cognition;transfer training   Clinical Decision Making Complexity (OT) problem focused assessment/low complexity   Risk & Benefits of therapy have been explained evaluation/treatment results reviewed;patient   OT Total Evaluation Time   OT Eval, Low Complexity Minutes (86680) 10   Therapy Certification   Medical Diagnosis malignant neoplasm of kidney   Start of Care Date 01/19/24   Certification date from 01/19/24   Certification date to 02/18/24   OT Goals   Therapy Frequency (OT) Daily   OT Predicted Duration/Target Date for Goal Attainment 01/25/24   OT Goals Toilet Transfer/Toileting;OT Goal 1;Bed Mobility   OT: Bed Mobility Modified independent;supine to/from sitting;rolling   OT: Toilet Transfer/Toileting Modified independent;toilet transfer;cleaning and garment management;Goal Met   OT: Goal 1 Pt will be IND w/ HEP after initial teaching   Interventions   Interventions Quick Adds Self-Care/Home Management   Self-Care/Home Management   Self-Care/Home Mgmt/ADL, Compensatory, Meal Prep Minutes (89459) 16   Symptoms Noted During/After Treatment (Meal Preparation/Planning Training) fatigue   Treatment Detail/Skilled Intervention Pt upright in bed upon arrival - c/o pain along sacral wound. Pt cued for bed mobility to sit EOB - completed w/ SBA. Pt demonstrated IND w/ LE dressing. Pt STS x3 w/ SBA and amb.  around room and to BR w/ SBA, no AD. Pt stood at sink for g/h tasks - completed oral cares, washed face, and combed hair while standing. Pt toileted IND. Pt fatigued and wanting to sit back in bed. Overall, pt doing well w/ ADLs but does fatigue quickly. Pt ended session back in bed w/ alarm on.   OT Discharge Planning   OT Plan 1/25: standing tolerance, maybe SLUMS?, UE ex   OT Discharge Recommendation (DC Rec) (S)  home with home care occupational therapy   OT Rationale for DC Rec Pt tolerating therapy well but does fatigue w/ activity. Pt lives alone and has adult kids assist w/ IADLs at times. Pt likely would benefit from home care for safety. Pt plans to move into [facility] in 2 weeks.   OT Brief overview of current status SBA for ADLs   Total Session Time   Timed Code Treatment Minutes 16   Total Session Time (sum of timed and untimed services) 26      [facility]  OUTPATIENT OCCUPATIONAL THERAPY  EVALUATION  PLAN OF TREATMENT FOR OUTPATIENT REHABILITATION  (COMPLETE FOR INITIAL CLAIMS ONLY)  Patient's Last Name, First Name, M.I.  YOB: 1945  Chelsea Peacock                          Provider's Name  [facility] Medical Record No.  [id number]                             Onset Date:  01/17/24   Start of Care Date:  01/19/24   Type:     ___PT   _X_OT   ___SLP Medical Diagnosis:  malignant neoplasm of kidney                    OT Diagnosis:  decreased ADLs Visits from SOC:  1     See note for plan of treatment, functional goals and certification details    I CERTIFY THE NEED FOR THESE SERVICES FURNISHED UNDER        THIS PLAN OF TREATMENT AND WHILE UNDER MY CARE     (Physician co-signature of this document indicates review and certification of the therapy plan).

## 2024-01-20 ENCOUNTER — APPOINTMENT (OUTPATIENT)
Dept: PHYSICAL THERAPY | Facility: CLINIC | Age: 79
End: 2024-01-20
Payer: COMMERCIAL

## 2024-01-20 LAB
ANION GAP SERPL CALCULATED.3IONS-SCNC: 6 MMOL/L (ref 7–15)
BUN SERPL-MCNC: 23.9 MG/DL (ref 8–23)
CALCIUM SERPL-MCNC: 8.2 MG/DL (ref 8.8–10.2)
CHLORIDE SERPL-SCNC: 106 MMOL/L (ref 98–107)
CREAT SERPL-MCNC: 0.87 MG/DL (ref 0.51–0.95)
DEPRECATED HCO3 PLAS-SCNC: 25 MMOL/L (ref 22–29)
EGFRCR SERPLBLD CKD-EPI 2021: 68 ML/MIN/1.73M2
GLUCOSE SERPL-MCNC: 140 MG/DL (ref 70–99)
HOLD SPECIMEN: NORMAL
MAGNESIUM SERPL-MCNC: 2.2 MG/DL (ref 1.7–2.3)
POTASSIUM SERPL-SCNC: 4.4 MMOL/L (ref 3.4–5.3)
SODIUM SERPL-SCNC: 137 MMOL/L (ref 135–145)
TSH SERPL DL<=0.005 MIU/L-ACNC: 2.74 UIU/ML (ref 0.3–4.2)

## 2024-01-20 PROCEDURE — 80048 BASIC METABOLIC PNL TOTAL CA: CPT | Performed by: STUDENT IN AN ORGANIZED HEALTH CARE EDUCATION/TRAINING PROGRAM

## 2024-01-20 PROCEDURE — 83735 ASSAY OF MAGNESIUM: CPT | Performed by: STUDENT IN AN ORGANIZED HEALTH CARE EDUCATION/TRAINING PROGRAM

## 2024-01-20 PROCEDURE — 99222 1ST HOSP IP/OBS MODERATE 55: CPT | Performed by: INTERNAL MEDICINE

## 2024-01-20 PROCEDURE — 99231 SBSQ HOSP IP/OBS SF/LOW 25: CPT | Mod: GC | Performed by: STUDENT IN AN ORGANIZED HEALTH CARE EDUCATION/TRAINING PROGRAM

## 2024-01-20 PROCEDURE — 84443 ASSAY THYROID STIM HORMONE: CPT | Performed by: STUDENT IN AN ORGANIZED HEALTH CARE EDUCATION/TRAINING PROGRAM

## 2024-01-20 PROCEDURE — 97116 GAIT TRAINING THERAPY: CPT | Mod: GP

## 2024-01-20 PROCEDURE — 36415 COLL VENOUS BLD VENIPUNCTURE: CPT | Performed by: STUDENT IN AN ORGANIZED HEALTH CARE EDUCATION/TRAINING PROGRAM

## 2024-01-20 PROCEDURE — 93010 ELECTROCARDIOGRAM REPORT: CPT | Performed by: INTERNAL MEDICINE

## 2024-01-20 PROCEDURE — 250N000013 HC RX MED GY IP 250 OP 250 PS 637

## 2024-01-20 PROCEDURE — 93005 ELECTROCARDIOGRAM TRACING: CPT

## 2024-01-20 PROCEDURE — G0378 HOSPITAL OBSERVATION PER HR: HCPCS

## 2024-01-20 PROCEDURE — 93005 ELECTROCARDIOGRAM TRACING: CPT | Performed by: STUDENT IN AN ORGANIZED HEALTH CARE EDUCATION/TRAINING PROGRAM

## 2024-01-20 PROCEDURE — 250N000012 HC RX MED GY IP 250 OP 636 PS 637

## 2024-01-20 RX ADMIN — BRIMONIDINE TARTRATE 1 DROP: 2 SOLUTION/ DROPS OPHTHALMIC at 08:30

## 2024-01-20 RX ADMIN — RIVAROXABAN 15 MG: 15 TABLET, FILM COATED ORAL at 08:30

## 2024-01-20 RX ADMIN — ACETAMINOPHEN 650 MG: 325 TABLET ORAL at 14:03

## 2024-01-20 RX ADMIN — Medication 1 MG: at 21:44

## 2024-01-20 RX ADMIN — PREDNISONE 20 MG: 20 TABLET ORAL at 08:30

## 2024-01-20 RX ADMIN — RIVAROXABAN 15 MG: 15 TABLET, FILM COATED ORAL at 17:32

## 2024-01-20 RX ADMIN — ACETAMINOPHEN 650 MG: 325 TABLET ORAL at 21:44

## 2024-01-20 RX ADMIN — PRAVASTATIN SODIUM 80 MG: 20 TABLET ORAL at 21:44

## 2024-01-20 RX ADMIN — BRIMONIDINE TARTRATE 1 DROP: 2 SOLUTION/ DROPS OPHTHALMIC at 20:43

## 2024-01-20 ASSESSMENT — ACTIVITIES OF DAILY LIVING (ADL)
ADLS_ACUITY_SCORE: 37
ADLS_ACUITY_SCORE: 38
ADLS_ACUITY_SCORE: 37
ADLS_ACUITY_SCORE: 37
ADLS_ACUITY_SCORE: 38

## 2024-01-20 NOTE — PLAN OF CARE
PRIMARY DIAGNOSIS: Malignant neoplasm of kidney      OUTPATIENT/OBSERVATION GOALS TO BE MET BEFORE DISCHARGE:  ADLs back to baseline: No     Activity and level of assistance: Up with standby assistance.     Pain status: Improved-controlled with oral pain medications.     Return to near baseline physical activity: No          Discharge Planner Nurse   Safe discharge environment identified: No  Barriers to discharge: Yes       Entered by: Carol Matson, RN   Please review provider order for any additional goals.   Nurse to notify provider when observation goals have been met and patient is ready for discharge.    Pt A&Ox4, VSS, RA. Denies any SOB or n/v. Pt c/o pain in coccyx area where stage 3 ulcer. PIV patent & SL. Tolerating regular diet. Pt care ongoing, call light within reach, bed in lowest position, alarms on for safety. Discharge pending.

## 2024-01-20 NOTE — PROGRESS NOTES
[facility]    Progress Note - Hospitalist Service       Date of Admission:  1/17/2024    Assessment & Plan   Chelsea Peacock is a 78 year old female admitted on 1/17/2024. She has a history of PMR, Osteoporosis, HTN, HLD and is admitted for bilateral pulmonary emboli and new left renal mass. Medically stable pending discharge to TCU tomorrow afternoon.       Bilateral PE  CT chest showing acute bilateral PE without evidence of right heart strain. Patient reporting decreased activity level but also concern for renal cell carcinoma on CT abdomen so provoked may be likely. US BLE to eval DVT - Nonocculsive DVT left posterior tibial vein within mid calf. Has not oxygen requirements.   Initially on heparin drip transitioned to Xarelto  Xarelto 15mg BID x21 days followed by 20mg daily indefinitely       Left Renal Mass  Pulmonary nodule   CT abdomen/pelvis showing new left renal mass suspicious for renal cell carcinoma. She does endorse weakness, poor appetite, weight loss, and presents with bilateral PE so malignancy does seem likely however could also be benign pathology. Urology recommending outpatient follow-up.  Follow-up with hematology/oncology outpatient, referral order placed    Asymptomatic Bradycardia.   Patient has been bradycardic this admission. No recorded bradycardia on chart review. Her PTA BP meds included lisinopril, Metoprolol and lasix. Hold parameters placed for Iinisopril and metoprolol. She has not received lasix since admission. EKG showed sinus bradycardia. Electrolytes were wnl.   Cardiology consult   Follow up TSH      Acute Cystitis   UA with +leuk esterase, WBC.  Ceftriaxone IV discontinued, patient asymptomatic and not complaining of any burning with urination.  Urine culture showed <50,000: Proteus mirabilis.   Urology signed off, recommending outpatient follow-up.     Sacral Wound  Not examined as patient was lying in hallway ED bed. Reports she recently  completed course of Keflex but did not finish last 2 days due to nausea.   WOC consult - see note     Generalized Weakness  FTT  Has been ongoing for 2-3 months. Likely related to above diagnosis. Also unclear on how long she has been on steroids and when stopped. Concern for component of adrenal insufficiency.  Manage per above  Start Prednisone 20mg PO daily  PT/OT consult      Polymyalgia Rheumatica  Had been on steroid taper since 11/21. Prednisone 25 mg ddaily x 28 days then 20 mg daily for 21 days. Patient reports she completed. I see a note from 1/11 recommending she start 20 mg daily again for ongoing pain symptoms. We will initiate the recommended dose for now and see if her weakness improves.   Prednisone 20 mg daily  Acetaminophen PRN for pain     Chronic Conditions:  HTN: Continue PTA lisinopril, Metoprolol with hold parameters given bradycardia and soft BP. Patient report she is not taking Lasix so will hold.   Osteoporosis: Hold PTA risedronate   HLD:  Continue PTA Pravastatin      Dispo planning:  Both nurse and BFM team feel strongly patient would benefit from TCU placement. Patient requiring assistance to stand as well as seems to fatigue easily. PT recs from yesterday's note indicate TCU a reasonable option. Patient and family also feel she would benefit from aid at TCU.     Observation Goals: -diagnostic tests and consults completed and resulted, -vital signs normal or at patient baseline, -tolerating oral intake to maintain hydration, -adequate pain control on oral analgesics, -returns to baseline functional status, -safe disposition plan has been identified, Nurse to notify provider when observation goals have been met and patient is ready for discharge.  Diet: Regular Diet Adult    DVT Prophylaxis: DOAC  Blanc Catheter: Not present  Fluids: PO  Lines: None     Cardiac Monitoring: None  Code Status: No CPR- Do NOT Intubate      Clinically Significant Risk Factors Present on Admission               # Hypoalbuminemia: Lowest albumin = 3 g/dL at 1/17/2024  2:26 PM, will monitor as appropriate     # Hypertension: Noted on problem list            # Financial/Environmental Concerns: none         Disposition Plan      Expected Discharge Date: 01/20/2024    Discharge Delays: *Medically Ready for Discharge  Placement - TCU  Destination: nursing home (For Transitional Care services)          The patient's care was discussed with the Attending Physician, Dr. Precious Dumont .    Ashlee Martinez MD PGY2  Hospitalist Service  [facility]  Securely message with [employer] info)  Text page via [employer] Paging/Directory   ______________________________________________________________________    Interval History   No acute events overnight.  Patient reports that the sacral pain is tolerable with the p.o. pain medications.  Otherwise reports that she is been eating more than she has ever had in several months.  Denies any nausea, vomiting, abdominal pain.  Has not had a bowel movement but feels like she is about to go.  Denies any chest pain, shortness of breath.    Physical Exam   Vital Signs: Temp: 97.1  F (36.2  C) Temp src: Oral BP: 107/54 Pulse: (!) 42   Resp: 16 (Nurse was immediately notified;) SpO2: 95 % O2 Device: None (Room air)    Weight: 131 lbs 12.8 oz    Constitutional: awake, alert, cooperative, no apparent distress  ENT: Normocephalic, moist mucous membranes  Respiratory: No increased work of breathing, good air exchange, bibasilar crackles, no wheezing on room air   cardiovascular: Bradycardic and rhythm, no murmur noted  GI: Bowel sounds present, abdomen soft, non-distended, non-tender, no masses palpated, no hepatosplenomegaly  Skin: Stage 3 sacral ulcer present on admission, see image below. Granulation tissue, no purulence noted. B/l upper and lower extremity lipoedema  Psych: Appropriate mood and affect      1/19:      Medical Decision Making       Please see A&P for additional details  of medical decision making.      Data   ------------------------- PAST 24 HR DATA REVIEWED -----------------------------------------------        Imaging results reviewed over the past 24 hrs:   No results found for this or any previous visit (from the past 24 hour(s)).

## 2024-01-20 NOTE — PLAN OF CARE
PRIMARY DIAGNOSIS: Malignant neoplasm of kidney      OUTPATIENT/OBSERVATION GOALS TO BE MET BEFORE DISCHARGE:  ADLs back to baseline: No     Activity and level of assistance: Up with standby assistance.     Pain status: Improved-controlled with oral pain medications.     Return to near baseline physical activity: No          Discharge Planner Nurse   Safe discharge environment identified: No  Barriers to discharge: Yes       Entered by: Carol Matson, RN   Please review provider order for any additional goals.   Nurse to notify provider when observation goals have been met and patient is ready for discharge.

## 2024-01-20 NOTE — PROGRESS NOTES
"PRIMARY DIAGNOSIS: \"GENERIC\" NURSING  OUTPATIENT/OBSERVATION GOALS TO BE MET BEFORE DISCHARGE:  ADLs back to baseline: Yes    Activity and level of assistance: Up with standby assistance.    Pain status: Pain free.    Return to near baseline physical activity: No     Discharge Planner Nurse   Safe discharge environment identified: No  Barriers to discharge: Yes       Entered by: Lilian Franklin RN 01/20/2024 6:56 AM     Please review provider order for any additional goals.   Nurse to notify provider when observation goals have been met and patient is ready for discharge.  "

## 2024-01-20 NOTE — PLAN OF CARE
Pt has been A&Ox4 throughout the shift. Pain has been managed with PRN tylenol. She has been ambulating with standby assistance and is able to make her needs known. She is looking forward to discharge tomorrow. Bed is in lowest position and call light within reach.           Goal Outcome Evaluation:         Problem: Adult Inpatient Plan of Care  Goal: Optimal Comfort and Wellbeing  Outcome: Progressing  Intervention: Monitor Pain and Promote Comfort  Recent Flowsheet Documentation  Taken 1/20/2024 0900 by Lilian Franklin RN  Pain Management Interventions: repositioned  Goal: Readiness for Transition of Care  Outcome: Progressing     Problem: Pain Acute  Goal: Optimal Pain Control and Function  Outcome: Progressing  Intervention: Develop Pain Management Plan  Recent Flowsheet Documentation  Taken 1/20/2024 0900 by Lilian Franklin RN  Pain Management Interventions: repositioned  Intervention: Prevent or Manage Pain  Recent Flowsheet Documentation  Taken 1/20/2024 1743 by Lilian Franklin RN  Medication Review/Management: medications reviewed  Taken 1/20/2024 0900 by Lilian Franklin RN  Medication Review/Management: medications reviewed     Problem: Fall Injury Risk  Goal: Absence of Fall and Fall-Related Injury  Outcome: Progressing  Intervention: Identify and Manage Contributors  Recent Flowsheet Documentation  Taken 1/20/2024 1743 by Lilian Franklin RN  Medication Review/Management: medications reviewed  Taken 1/20/2024 0900 by Lilian Franklin RN  Medication Review/Management: medications reviewed  Intervention: Promote Injury-Free Environment  Recent Flowsheet Documentation  Taken 1/20/2024 1743 by Lilian Franklin RN  Safety Promotion/Fall Prevention: safety round/check completed  Taken 1/20/2024 0900 by Lilian Franklin RN  Safety Promotion/Fall Prevention: safety round/check completed     Problem: Wound  Goal: Optimal Functional Ability  Outcome: Progressing  Goal: Absence of Infection Signs and Symptoms  Outcome:  Progressing  Goal: Improved Oral Intake  Outcome: Progressing  Goal: Skin Health and Integrity  Outcome: Progressing

## 2024-01-20 NOTE — PROGRESS NOTES
Care Management Follow Up    Length of Stay (days): 0    Expected Discharge Date: 01/20/2024     Concerns to be Addressed: care coordination/care conferences, discharge planning     Patient plan of care discussed at interdisciplinary rounds: Yes    Anticipated Discharge Disposition: Transitional Care     Anticipated Discharge Services: None  Anticipated Discharge DME: Other (see comment) (TBD further)    Patient/family educated on Medicare website which has current facility and service quality ratings: yes  Education Provided on the Discharge Plan: Yes (Assessment and service coordination process explained)  Patient/Family in Agreement with the Plan: yes    Referrals Placed by CM/SW:  (Not yet indicated)  Private pay costs discussed: Not applicable    Additional Information:  11:09 AM  [facility] - Called; left voicemail asking for update on status of Insurance Auth.    1:05 PM  [facility] - Per Hadley Charge of Building - [employer] Insurance Auth has been approved.  They can accept pt tomorrow with a ride time of 1400/1500pm.  They need pt's Medicare Number.    1:24 PM  Update given to pt and daughter Diane.  They are agreeable to this TCU placement.    Discussed [facility] transportation and potential out of pocket cost of wheelchair vs stretcher.  They decline medical transport.  Family (pt's son) can transport and will be on standby for anticipated discharge tomorrow afternoon.    Diane will contact CM to provide pt's Medicare Number.    Update given to BFM.  Update given to bedside RN, Charge RN.    Fabiana Contreras is primary contact.    2:43 PM   Per Diane, pt's medicare #: [id number].    3:32 PM  PAS done - [id number] - does not require an OBRA level 2 assessment.    Silvia Mora RN

## 2024-01-20 NOTE — CONSULTS
Thank you, Dr. Wright ref. provider found, for asking the [facility] Care team to see Chelsea Peacock to evaluate sinus bradycardia.      Assessment/Recommendations   Assessment:    Sinus bradycardia, mild, asymptomatic, probably related to intrinsic sinus node dysfunction worsened by low dose of metoprolol  Hypertension, good control  Bilateral PE, hemodynamically stable  Renal mass suspicious for carcinoma  Mild coronary calcification suggestive of nonobstructive coronary artery disease  Polymyalgia rheumatica  Weight loss probably related to malignancy    Plan:  Stop metoprolol  No need for device therapy at this point.  Her prognosis will be primary determined by etiology of renal mass.  I do not believe that any aggressive heart rhythm monitoring is justified unless she were to develop syncope or near syncope.    Adjust blood pressure medications if blood pressure becomes elevated without metoprolol(very doubtful)    Cardiology will sign off      Clinically Significant Risk Factors Present on Admission              # Hypoalbuminemia: Lowest albumin = 3 g/dL at 1/17/2024  2:26 PM, will monitor as appropriate     # Hypertension: Noted on problem list          # Financial/Environmental Concerns: none             History of Present Illness/Subjective    Ms. Chelsea Peacock is a 78 year old female who was admitted with generalized weakness.  She was found to have bilateral PE and new renal mass suspicious for carcinoma.  She has been noted to be mildly bradycardic.  She denies lightheadedness or feeling faint.  She has no history of syncope.  She states that she has been taking metoprolol for a number of years for elevated blood pressure.  She reported that she lost more than half of her weight in the last 12+ months.  She has no history of known heart disease.    ECG: Personally reviewed.  Sinus bradycardia otherwise normal.      CT chest and abdomen  1.  Acute-appearing bilateral pulmonary emboli. No pulmonary  "infarction or right heart strain.  2.  2.7 cm solid left kidney mass represents renal cell carcinoma until proven otherwise. 4 mm right lower lobe pulmonary nodule could represent a metastasis, but is nonspecific and benign etiologies are in the differential.   Mild coronary calcification     Physical Examination Review of Systems   /53 (BP Location: Right arm)   Pulse (!) 44   Temp 97.2  F (36.2  C) (Oral)   Resp 16   Ht 1.6 m (5' 3\")   Wt 59.8 kg (131 lb 12.8 oz)   SpO2 96%   BMI 23.35 kg/m    Body mass index is 23.35 kg/m .  Wt Readings from Last 3 Encounters:   01/20/24 59.8 kg (131 lb 12.8 oz)   11/21/23 68 kg (150 lb)   11/17/23 66.2 kg (146 lb)       Intake/Output Summary (Last 24 hours) at 1/20/2024 1432  Last data filed at 1/19/2024 2159  Gross per 24 hour   Intake 520 ml   Output --   Net 520 ml     General Appearance:   Alert, cooperative, no distress, appears stated age   Head/ENT: Normocephalic, without obvious abnormality. Membranes moist.      EYES:  No scleral icterus   Neck: Supple, symmetrical, trachea midline, no adenopathy, thyroid: not enlarged, symmetric, no carotid bruit or JVD   Chest/Lungs:   lungs are clear to auscultation, respirations unlabored. No tenderness or deformity   Cardiovascular:   Regular rhythm, S1, S2 normal, no murmur, rub or gallop.   Abdomen:  Soft, non-tender, bowel sounds active all four quadrants,  no masses, no organomegaly   Extremities: no cyanosis or clubbing. No edema   Skin: Skin color, texture, turgor normal, no rashes or lesions.    Neurologic: Alert and oriented x 3, moving all four extremities.    Psychiatric: Normal affect.      10 system review of systems completed see history of present illness and inpatient H&P (reviewed) for further details.          Lab Results    Chemistry/lipid CBC Cardiac Enzymes/BNP/TSH/INR   Recent Labs   Lab Test 05/24/23  0945   CHOL 149   HDL 56   LDL 72   TRIG 106     Recent Labs   Lab Test 05/24/23  0945 " "11/16/22  1136 05/16/22  0921   LDL 72 73 66     Recent Labs   Lab Test 01/20/24  1139      POTASSIUM 4.4   CHLORIDE 106   CO2 25   *   BUN 23.9*   CR 0.87   GFRESTIMATED 68   TRACEY 8.2*     Recent Labs   Lab Test 01/20/24  1139 01/18/24  0611 01/17/24  1426   CR 0.87 1.25* 1.14*     No results for input(s): \"A1C\" in the last 49027 hours.       Recent Labs   Lab Test 01/18/24  0611   WBC 8.3   HGB 10.8*   HCT 32.5*   MCV 88        Recent Labs   Lab Test 01/18/24  0611 01/17/24  1843 01/17/24  1426   HGB 10.8* 11.3* 13.6    No results for input(s): \"TROPONINI\" in the last 12171 hours.  Recent Labs   Lab Test 01/17/24  1426   NTBNPI 1,016     Recent Labs   Lab Test 11/16/21  0913   TSH 3.07     No results for input(s): \"INR\" in the last 15559 hours.     Medical History  Surgical History Family History Social History   Hypertension  No premature CAD, SCD,cardiomyopathy   Social History     Socioeconomic History    Marital status:      Spouse name: Not on file    Number of children: Not on file    Years of education: Not on file    Highest education level: Not on file   Occupational History    Not on file   Tobacco Use    Smoking status: Former     Types: Cigarettes    Smokeless tobacco: Never   Vaping Use    Vaping Use: Never used   Substance and Sexual Activity    Alcohol use: Not on file    Drug use: Not on file    Sexual activity: Not on file   Other Topics Concern    Not on file   Social History Narrative    Not on file     Social Determinants of Health     Financial Resource Strain: Not on file   Food Insecurity: Not on file   Transportation Needs: Not on file   Physical Activity: Not on file   Stress: Not on file   Social Connections: Not on file   Interpersonal Safety: Not on file   Housing Stability: Not on file         Medications  Allergies   Current Facility-Administered Medications Ordered in Epic   Medication Dose Route Frequency Provider Last Rate Last Admin    acetaminophen " (TYLENOL) tablet 650 mg  650 mg Oral Q4H PRN Pricilla Mccabe MD   650 mg at 01/20/24 1403    Or    acetaminophen (TYLENOL) Suppository 650 mg  650 mg Rectal Q4H PRN Pricilla Mccabe MD        brimonidine (ALPHAGAN) 0.2 % ophthalmic solution 1 drop  1 drop Both Eyes BID Pricilla Mccabe MD   1 drop at 01/20/24 0830    [Held by provider] furosemide (LASIX) tablet 20 mg  20 mg Oral Daily Pricilla Mccabe MD        lisinopril (ZESTRIL) tablet 40 mg  40 mg Oral Daily Pricilla Mcacbe MD   40 mg at 01/18/24 0826    melatonin tablet 1 mg  1 mg Oral At Bedtime PRN Pricilla Mccabe MD   1 mg at 01/19/24 2142    ondansetron (ZOFRAN ODT) ODT tab 4 mg  4 mg Oral Q6H PRN Pricilla Mccabe MD        Or    ondansetron (ZOFRAN) injection 4 mg  4 mg Intravenous Q6H PRN Pricilla Mccabe MD        oxyCODONE IR (ROXICODONE) half-tab 2.5 mg  2.5 mg Oral Q4H PRN Pricilla Mccabe MD   2.5 mg at 01/18/24 2105    Patient is already receiving anticoagulation with heparin, enoxaparin (LOVENOX), warfarin (COUMADIN)  or other anticoagulant medication   Does not apply Continuous PRN Pricilla Mccabe MD        pravastatin (PRAVACHOL) tablet 80 mg  80 mg Oral At Bedtime Pricilla Mccabe MD   80 mg at 01/19/24 2142    predniSONE (DELTASONE) tablet 20 mg  20 mg Oral Daily Pricilla Mccabe MD   20 mg at 01/20/24 0830    rivaroxaban ANTICOAGULANT (XARELTO) tablet 15 mg  15 mg Oral BID w/meals Molina Perez DO   15 mg at 01/20/24 0830    Followed by    [START ON 2/8/2024] rivaroxaban ANTICOAGULANT (XARELTO) tablet 20 mg  20 mg Oral Daily with supper Molina Perez DO        senna-docusate (SENOKOT-S/PERICOLACE) 8.6-50 MG per tablet 1 tablet  1 tablet Oral BID PRN Pricilla Mccabe MD        Or    senna-docusate (SENOKOT-S/PERICOLACE) 8.6-50 MG per tablet 2 tablet  2 tablet Oral BID PRN Pricilla Mccabe MD         Current Outpatient Medications Ordered in Epic   Medication Sig Dispense Refill    cefdinir (OMNICEF) 300 MG capsule Take 1 capsule (300  mg) by mouth 2 times daily for 7 days 14 capsule 0       Allergies   Allergen Reactions    Cortisone Itching    Sulfa (Sulfonamide Antibiotics) [Sulfa Antibiotics] Itching

## 2024-01-20 NOTE — PROGRESS NOTES
"     PRIMARY DIAGNOSIS: \"GENERIC\" NURSING  OUTPATIENT/OBSERVATION GOALS TO BE MET BEFORE DISCHARGE:  ADLs back to baseline: Yes     Activity and level of assistance: Up with standby assistance.     Pain status: Pain free.     Return to near baseline physical activity: No          Discharge Planner Nurse   Safe discharge environment identified: No  Barriers to discharge: Yes       Entered by: Lilian Franklin RN 01/20/2024 6:56 AM     Please review provider order for any additional goals.   Nurse to notify provider when observation goals have been met and patient is ready for discharge.        "

## 2024-01-20 NOTE — UTILIZATION REVIEW
Concurrent stay review; Secondary Review Determination - [facility]        Under the authority of the Utilization Management Committee, the utilization review process indicated a secondary review on the above patient.  The review outcome is based on review of the medical records, discussions with staff, and applying clinical experience noted on the date of the review.        (x) Observation/outpatient Status Appropriate - Concurrent stay review       RATIONALE FOR DETERMINATION:     78 year old female with a past medical history of PMR, Osteoporosis, HTN, HLD who presented to ER for weakness and is admitted for bilateral pulmonary emboli, bradycardia and new left renal mass.cardiology consult , Medically stable pending discharge to TCU tomorrow afternoon.     Patient delayed discharge is related to disposition, there is no medical necessity for inpatient admission at the time of this review. If there is a change in patient status, please resend for review.    The information on this document is developed by the utilization review team in order for the business office to ensure compliance.  This only denotes the appropriateness of proper admission status and does not reflect the quality of care rendered.       The definitions of Inpatient Status and Observation Status used in making the determination above are those provided in the CMS Coverage Manual, Chapter 1 and Chapter 6, section 70.4.       Sincerely,    Bryan Cat MD

## 2024-01-21 ENCOUNTER — DOCUMENTATION ONLY (OUTPATIENT)
Dept: GERIATRICS | Facility: CLINIC | Age: 79
End: 2024-01-21
Payer: COMMERCIAL

## 2024-01-21 VITALS
RESPIRATION RATE: 18 BRPM | BODY MASS INDEX: 23.14 KG/M2 | SYSTOLIC BLOOD PRESSURE: 107 MMHG | HEART RATE: 50 BPM | DIASTOLIC BLOOD PRESSURE: 51 MMHG | WEIGHT: 130.6 LBS | TEMPERATURE: 97.7 F | HEIGHT: 63 IN | OXYGEN SATURATION: 96 %

## 2024-01-21 PROBLEM — L89.153 PRESSURE INJURY OF COCCYGEAL REGION, STAGE 3 (H): Status: ACTIVE | Noted: 2024-01-21

## 2024-01-21 PROBLEM — R91.1 PULMONARY NODULE, RIGHT: Status: ACTIVE | Noted: 2024-01-21

## 2024-01-21 PROBLEM — K59.03 DRUG-INDUCED CONSTIPATION: Status: ACTIVE | Noted: 2024-01-21

## 2024-01-21 LAB
BACTERIA UR CULT: ABNORMAL
BACTERIA UR CULT: ABNORMAL

## 2024-01-21 PROCEDURE — 250N000013 HC RX MED GY IP 250 OP 250 PS 637

## 2024-01-21 PROCEDURE — 258N000003 HC RX IP 258 OP 636

## 2024-01-21 PROCEDURE — 99239 HOSP IP/OBS DSCHRG MGMT >30: CPT | Mod: GC

## 2024-01-21 PROCEDURE — 250N000012 HC RX MED GY IP 250 OP 636 PS 637

## 2024-01-21 PROCEDURE — G0378 HOSPITAL OBSERVATION PER HR: HCPCS

## 2024-01-21 RX ORDER — POLYETHYLENE GLYCOL 3350 17 G/17G
1 POWDER, FOR SOLUTION ORAL DAILY PRN
Qty: 510 G | Refills: 0 | Status: SHIPPED | OUTPATIENT
Start: 2024-01-21 | End: 2024-01-23

## 2024-01-21 RX ORDER — OXYCODONE HYDROCHLORIDE 5 MG/1
2.5 TABLET ORAL EVERY 4 HOURS PRN
Qty: 10 TABLET | Refills: 0 | Status: SHIPPED | OUTPATIENT
Start: 2024-01-21

## 2024-01-21 RX ADMIN — BRIMONIDINE TARTRATE 1 DROP: 2 SOLUTION/ DROPS OPHTHALMIC at 08:23

## 2024-01-21 RX ADMIN — SODIUM CHLORIDE 500 ML: 9 INJECTION, SOLUTION INTRAVENOUS at 00:38

## 2024-01-21 RX ADMIN — RIVAROXABAN 15 MG: 15 TABLET, FILM COATED ORAL at 08:22

## 2024-01-21 RX ADMIN — PREDNISONE 20 MG: 20 TABLET ORAL at 08:22

## 2024-01-21 ASSESSMENT — ACTIVITIES OF DAILY LIVING (ADL)
ADLS_ACUITY_SCORE: 38
ADLS_ACUITY_SCORE: 37
ADLS_ACUITY_SCORE: 38
ADLS_ACUITY_SCORE: 37
ADLS_ACUITY_SCORE: 37
ADLS_ACUITY_SCORE: 38
ADLS_ACUITY_SCORE: 37

## 2024-01-21 NOTE — PROGRESS NOTES
Care Management Discharge Note    Discharge Date: 01/21/2024       Discharge Disposition: Transitional Care    Discharge Services: None    Discharge DME: N/A    Discharge Transportation: family or friend will provide    Private pay costs discussed: Not applicable    Does the patient's insurance plan have a 3 day qualifying hospital stay waiver?  No    PAS Confirmation Code: [id number]  Patient/family educated on Medicare website which has current facility and service quality ratings: yes    Education Provided on the Discharge Plan: Yes (Assessment and service coordination process explained)    Persons Notified of Discharge Plans: patient, daughter Diane    Patient/Family in Agreement with the Plan: yes    Handoff Referral Completed: Yes    Additional Information:    [facility]  Daughter Diane confirms, family will transport at 1400/1500pm today 1/21.  [facility] auth has been approved.  [id number].     [facility]    [facility] to fax in discharge orders, hard scripts, any additional discharge paperwork, per protocol.    CC referral sent per protocol.     Silvia Mora RN

## 2024-01-21 NOTE — PLAN OF CARE
Pt discharged. Reviewed continued plan of care, changes in medications, and side effects of medications with the patient and family. IV removed. Pt able to ambulate with standby assistance. Son to drive to TCU. All belongings gathered and left with the patient.             Goal Outcome Evaluation:         Problem: Adult Inpatient Plan of Care  Goal: Absence of Hospital-Acquired Illness or Injury  Intervention: Identify and Manage Fall Risk  Recent Flowsheet Documentation  Taken 1/21/2024 0833 by Lilian Franklin RN  Safety Promotion/Fall Prevention: safety round/check completed  Intervention: Prevent Infection  Recent Flowsheet Documentation  Taken 1/21/2024 0833 by Lilian Franklin RN  Infection Prevention:   single patient room provided   rest/sleep promoted   personal protective equipment utilized   hand hygiene promoted   equipment surfaces disinfected     Problem: Pain Acute  Goal: Optimal Pain Control and Function  Intervention: Prevent or Manage Pain  Recent Flowsheet Documentation  Taken 1/21/2024 0833 by Lilian Franklin RN  Medication Review/Management: medications reviewed     Problem: Fall Injury Risk  Goal: Absence of Fall and Fall-Related Injury  Intervention: Identify and Manage Contributors  Recent Flowsheet Documentation  Taken 1/21/2024 0833 by Lilian Franklin RN  Medication Review/Management: medications reviewed  Intervention: Promote Injury-Free Environment  Recent Flowsheet Documentation  Taken 1/21/2024 0833 by Lilian Franklin RN  Safety Promotion/Fall Prevention: safety round/check completed

## 2024-01-21 NOTE — PLAN OF CARE
RIMARY DIAGNOSIS: Malignant neoplasm of kidney      OUTPATIENT/OBSERVATION GOALS TO BE MET BEFORE DISCHARGE:  ADLs back to baseline: No     Activity and level of assistance: Up with standby assistance.     Pain status: Improved-controlled with oral pain medications.     Return to near baseline physical activity: No          Discharge Planner Nurse   Safe discharge environment identified: No  Barriers to discharge: Yes       Entered by: Carol Matson, RN   Please review provider order for any additional goals.   Nurse to notify provider when observation goals have been met and patient is ready for discharge.

## 2024-01-21 NOTE — PROGRESS NOTES
Occupational Therapy Discharge Summary    Reason for therapy discharge:    Discharged to transitional care facility.    Progress towards therapy goal(s). See goals on Care Plan in [facility] electronic health record for goal details.  Goals partially met.  Barriers to achieving goals:   discharge from facility.    Therapy recommendation(s):    Continued therapy is recommended.  Rationale/Recommendations:  home therapy to facilitate safe transition home w/ ADLs.

## 2024-01-21 NOTE — PROGRESS NOTES
"PRIMARY DIAGNOSIS: \"GENERIC\" NURSING  OUTPATIENT/OBSERVATION GOALS TO BE MET BEFORE DISCHARGE:  ADLs back to baseline: Yes    Activity and level of assistance: Up with standby assistance.    Pain status: Improved-controlled with oral pain medications.    Return to near baseline physical activity: Yes     Discharge Planner Nurse   Safe discharge environment identified: Yes  Barriers to discharge: No,will transport when ride arrives.        Entered by: Lilian Franklin RN 01/21/2024 1:11 PM     Please review provider order for any additional goals.   Nurse to notify provider when observation goals have been met and patient is ready for discharge.  "

## 2024-01-21 NOTE — PROGRESS NOTES
"PRIMARY DIAGNOSIS: \"GENERIC\" NURSING  OUTPATIENT/OBSERVATION GOALS TO BE MET BEFORE DISCHARGE:  ADLs back to baseline: Yes    Activity and level of assistance: Up with standby assistance.    Pain status: Improved-controlled with oral pain medications.    Return to near baseline physical activity: No     Discharge Planner Nurse   Safe discharge environment identified: Yes  Barriers to discharge: Yes       Entered by: Lilian Franklin RN 01/21/2024 8:44 AM     Please review provider order for any additional goals.   Nurse to notify provider when observation goals have been met and patient is ready for discharge.  "

## 2024-01-21 NOTE — PROVIDER NOTIFICATION
Writer paged DERICK MAZARIEGOS d/t lower BP Rt arm 88/53, Lt arm 88/45.    500mL bolus was ordered. Bp to be rechecked

## 2024-01-21 NOTE — PROGRESS NOTES
Physical Therapy Discharge Summary    Reason for therapy discharge:    Discharged to transitional care facility.    Progress towards therapy goal(s). See goals on Care Plan in [facility] electronic health record for goal details.  Goals partially met.  Barriers to achieving goals:   goals updated following session 1/20.    Therapy recommendation(s):    Continued therapy is recommended.  Rationale/Recommendations:  recommend home with assist and home PT, pt discharging to TCU.

## 2024-01-21 NOTE — PLAN OF CARE
PRIMARY DIAGNOSIS: Malignant neoplasm of kidney      OUTPATIENT/OBSERVATION GOALS TO BE MET BEFORE DISCHARGE:  ADLs back to baseline: No     Activity and level of assistance: Up with standby assistance.     Pain status: Improved-controlled with oral pain medications.     Return to near baseline physical activity: No          Discharge Planner Nurse   Safe discharge environment identified: No  Barriers to discharge: Yes       Entered by: Carol Matson, RN   Please review provider order for any additional goals.   Nurse to notify provider when observation goals have been met and patient is ready for discharge.     Pt A&Ox4, VSS, RA. Denies any SOB or n/v. Pt c/o pain in coccyx area where stage 3 ulcer. PIV patent & SL.Pt received 1 500mL bolus d/t hypotension, effective treatment. Tolerating regular diet. Pt care ongoing, call light within reach, bed in lowest position, alarms on for safety. Discharge pending.

## 2024-01-22 ENCOUNTER — PATIENT OUTREACH (OUTPATIENT)
Dept: ONCOLOGY | Facility: CLINIC | Age: 79
End: 2024-01-22

## 2024-01-22 LAB
ATRIAL RATE - MUSE: 52 BPM
DIASTOLIC BLOOD PRESSURE - MUSE: NORMAL MMHG
INTERPRETATION ECG - MUSE: NORMAL
P AXIS - MUSE: 60 DEGREES
PR INTERVAL - MUSE: 128 MS
QRS DURATION - MUSE: 94 MS
QT - MUSE: 426 MS
QTC - MUSE: 396 MS
R AXIS - MUSE: 41 DEGREES
SYSTOLIC BLOOD PRESSURE - MUSE: NORMAL MMHG
T AXIS - MUSE: 55 DEGREES
VENTRICULAR RATE- MUSE: 52 BPM

## 2024-01-22 NOTE — PROGRESS NOTES
New Patient Oncology Nurse Navigator Note     Referring provider:   Ashlee Martinez MD, Sana, MD [address] [phone]        Referred to (specialty): Medical Oncology    Date Referral Received:   1/19/24     Evaluation for :   right lower lobe pulmonary nodule, pt will follow up with Urology for left renal mass     Clinical History (per Nurse review of records provided):    Patient recently seen in the ED, for nausea, vomiting and failure to thrive.      EXAM: CT CHEST/ABDOMEN/PELVIS W CONTRAST  LOCATION: [facility]  DATE: 1/17/2024      INDICATION: Nausea, productive cough, lower abdominal pain    IMPRESSION:  1.  Acute-appearing bilateral pulmonary emboli. No pulmonary infarction or right heart strain.  2.  2.7 cm solid left kidney mass represents renal cell carcinoma until proven otherwise. 4 mm right lower lobe pulmonary nodule could represent a metastasis, but is nonspecific and benign etiologies are in the differential. Recommend urology   consultation when clinically feasible.      Oncology was consulted regarding findings on CT.  Dr. Quinteros is recommending urology to follow up with the renal mass and follow-up of the lung nodule in the future.  Requesting referral to heme-onc as an outpatient. Nonurgent.       Records Location (Care Everywhere, Media, etc.):   Epic     PLAN:   SCHEDULE:  Dr. Quinteros @ [facility] location, NEW, in person, Schedule sometime after visit with urology  OR First available after visit with urology @ any location with med onc for kidney cancer, NEW, in person    I called in attempt to reach Chelsea to discuss referral to medical oncology.  I called and LM for pt to call new patient scheduling to schedule.

## 2024-01-23 ENCOUNTER — TRANSITIONAL CARE UNIT VISIT (OUTPATIENT)
Dept: GERIATRICS | Facility: CLINIC | Age: 79
End: 2024-01-23
Payer: COMMERCIAL

## 2024-01-23 VITALS
WEIGHT: 135 LBS | BODY MASS INDEX: 23.92 KG/M2 | RESPIRATION RATE: 16 BRPM | OXYGEN SATURATION: 94 % | TEMPERATURE: 97.5 F | HEIGHT: 63 IN | SYSTOLIC BLOOD PRESSURE: 108 MMHG | HEART RATE: 62 BPM | DIASTOLIC BLOOD PRESSURE: 68 MMHG

## 2024-01-23 DIAGNOSIS — I12.9 HYPERTENSIVE CHRONIC KIDNEY DISEASE WITH STAGE 1 THROUGH STAGE 4 CHRONIC KIDNEY DISEASE, OR UNSPECIFIED CHRONIC KIDNEY DISEASE: Primary | ICD-10-CM

## 2024-01-23 DIAGNOSIS — N18.31 CHRONIC KIDNEY DISEASE, STAGE 3A (H): ICD-10-CM

## 2024-01-23 DIAGNOSIS — C64.9 MALIGNANT NEOPLASM OF KIDNEY, UNSPECIFIED LATERALITY (H): ICD-10-CM

## 2024-01-23 DIAGNOSIS — H90.3 SENSORINEURAL HEARING LOSS (SNHL) OF BOTH EARS: ICD-10-CM

## 2024-01-23 DIAGNOSIS — L89.153 PRESSURE INJURY OF COCCYGEAL REGION, STAGE 3 (H): ICD-10-CM

## 2024-01-23 DIAGNOSIS — I26.99 BILATERAL PULMONARY EMBOLISM (H): ICD-10-CM

## 2024-01-23 PROBLEM — H91.90 HEARING LOSS: Status: ACTIVE | Noted: 2024-01-23

## 2024-01-23 PROBLEM — E55.9 VITAMIN D DEFICIENCY: Status: ACTIVE | Noted: 2024-01-23

## 2024-01-23 PROBLEM — H40.9 GLAUCOMA OF BOTH EYES, UNSPECIFIED GLAUCOMA TYPE: Status: ACTIVE | Noted: 2024-01-23

## 2024-01-23 PROBLEM — M25.562 PAIN IN LEFT KNEE: Status: ACTIVE | Noted: 2024-01-23

## 2024-01-23 PROBLEM — H25.13 AGE-RELATED NUCLEAR CATARACT OF BOTH EYES: Status: ACTIVE | Noted: 2024-01-23

## 2024-01-23 PROBLEM — E53.8 VITAMIN B12 DEFICIENCY: Status: ACTIVE | Noted: 2024-01-23

## 2024-01-23 PROBLEM — R20.2 PARESTHESIAS IN RIGHT HAND: Status: ACTIVE | Noted: 2024-01-23

## 2024-01-23 PROCEDURE — 99305 1ST NF CARE MODERATE MDM 35: CPT | Performed by: FAMILY MEDICINE

## 2024-01-23 NOTE — PROGRESS NOTES
[facility]       Patient Chelsea Peacock  MRN: [id number]        Reason for Visit     Chief Complaint   Patient presents with    Hospital F/U       Code Status     DNR / DNI    Assessment       Generalized weakness  Kidney Mass  Bilateral PE  Pressure injury stage III  History of weight loss of greater than 100 pounds per patient report  Asymptomatic bradycardia  Acute cystitis with cultures growing Proteus  Hypertension  PMR/steroid-dependent  Osteoporosis  Hyperlipidemia    Plan     Pt is admitted to TCU for strengthening and rehab.    Recheck labs  Continue with PT/OT    Bilateral PE   - Continue Rivaraxaban 15 mg BID x 21 days, then 20 mg daily  2. Left Renal Mass, Pulmonary Nodule   - Follow up with Urology in 2-3 weeks  3. HTN  Asymptomatic Bradycardia   - Metoprolol and Lisinopril were held on hospital discharge   - Monitor BP/Heart rate and blood pressures continue to run on the low side  She is only on a low-dose of furosemide  4. Sacral Wound   - Continue Wound care; improved  5. Generalized Weakness   - Concern for component of adrenal insufficiency with chronic steroid use  Noted to be now ambulating independently without any assist device.  No longer on steroids   - PT/OT  6. PMR-not on any prednisone any longer      History     Patient is a very pleasant 78 year old female who is admitted to TCU.  Patient presented to [facility] ED with weakness, and failure to thrive. She was diagnosed with bilateral PE and new left renal mass. She was discharged on Xarelto for treatment. She was seen by Urology in the hospital and recommended to follow up with Dr Brewer from [facility] in 2-3 weeks.   She was discharged to TCU for ongoing PT/OT. Her family is assisting her with moving to [facility] on 2/2/24.  Today, she is overall feeling well. She is up independently and worked with PT earlier in the day. She has not pain.       Past Medical & Surgical History     PAST MEDICAL HISTORY: History reviewed. No  pertinent past medical history.   PAST SURGICAL HISTORY:   has no past surgical history on file.      Past Social History     Reviewed,  reports that she has quit smoking. Her smoking use included cigarettes. She has never used smokeless tobacco.    Family History     Reviewed, and family history is not on file.    Medication List     Current Outpatient Medications   Medication    brimonidine (ALPHAGAN) 0.2 % ophthalmic solution    calcium carbonate-vitamin D (OSCAL) 500-5 MG-MCG tablet    cholecalciferol, vitamin D3, 1,000 unit tablet    cyanocobalamin 1000 MCG tablet    furosemide (LASIX) 20 MG tablet    glucosamine sulfate (GLUCOSAMINE) 500 mg Tab    Omega-3 Fatty Acids (FISH OIL) 1200 MG capsule    oxyCODONE (ROXICODONE) 5 MG tablet    potassium chloride SA (K-DUR,KLOR-CON) 10 MEQ tablet    pravastatin (PRAVACHOL) 80 MG tablet    RISEdronate (ACTONEL) 35 MG tablet    rivaroxaban ANTICOAGULANT (XARELTO) 15 MG TABS tablet    rivaroxaban ANTICOAGULANT (XARELTO) 20 MG TABS tablet    therapeutic multivitamin (THERAGRAN) tablet     No current facility-administered medications for this visit.      MED REC REQUIRED  Post Medication Reconciliation Status:          Allergies     Allergies   Allergen Reactions    Cortisone Itching    Sulfa (Sulfonamide Antibiotics) [Sulfa Antibiotics] Itching    Sulfamethoxazole-Trimethoprim Unknown       Review of Systems   A comprehensive review of 14 systems was done. Pertinent findings noted here and in history of present illness. All the rest negative.  Constitutional: Negative.  Negative for fever, chills, she has  activity change, appetite change and fatigue.   Reporting weight loss of greater than 100 pounds  HENT: Negative for congestion and facial swelling.    Eyes: Negative for photophobia, redness and visual disturbance.   Respiratory: Negative for cough and chest tightness.    Cardiovascular: Negative for chest pain, palpitations and leg swelling.   Gastrointestinal: Negative  "for nausea, diarrhea, constipation, blood in stool and abdominal distention.   Genitourinary: Negative.    Musculoskeletal: Negative.  She is ambulating without any assist device  Skin: Negative.  Has a pressure injury in her sacrum  Neurological: Negative for dizziness, tremors, syncope, weakness, light-headedness and headaches.   Hematological: Does not bruise/bleed easily.   Psychiatric/Behavioral: Negative.        Physical Exam   /68   Pulse 62   Temp 97.5  F (36.4  C)   Resp 16   Ht 1.6 m (5' 3\")   Wt 61.2 kg (135 lb)   SpO2 94%   BMI 23.91 kg/m       Constitutional: Oriented to person, place, and appears well-developed.   HEENT:  Normocephalic and atraumatic.  Eyes: Conjunctivae and EOM are normal. Pupils are equal, round, and reactive to light. No discharge.  No scleral icterus. Nose normal. Mouth/Throat: Oropharynx is clear and moist. No oropharyngeal exudate.    Noted to have hearing loss  NECK: Normal range of motion. Neck supple. No JVD present. No tracheal deviation present. No thyromegaly present.   CARDIOVASCULAR: Normal rate, regular rhythm and intact distal pulses.  Exam reveals no gallop and no friction rub.    PULMONARY: Effort normal and breath sounds normal. No respiratory distress.No Wheezing or rales.  ABDOMEN: Soft. Bowel sounds are normal. No distension and no mass.  There is no tenderness. There is no rebound and no guarding. No HSM.  MUSCULOSKELETAL: Normal range of motion. Mild kyphosis, no tenderness.  LYMPH NODES: Has no cervical, supraclavicular, axillary and groin adenopathy.   NEUROLOGICAL: Alert and oriented to person, place, and time. No cranial nerve deficit.  Normal muscle tone. Coordination normal.   GENITOURINARY: Deferred exam.  SKIN: Skin is warm and dry. No rash noted. No erythema. No pallor.   EXTREMITIES: No cyanosis, no clubbing, no edema. No Deformity.  PSYCHIATRIC: Normal mood, affect and behavior.  SKIN: Pressure ulcer present on coccyx area.  With 2 " satellite lesions      Lab Results     Recent Results (from the past 240 hour(s))   Extra Blue Top Tube    Collection Time: 01/17/24  2:26 PM   Result Value Ref Range    Hold Specimen JIC    Extra Red Top Tube    Collection Time: 01/17/24  2:26 PM   Result Value Ref Range    Hold Specimen JIC    Extra Green Top (Lithium Heparin) Tube    Collection Time: 01/17/24  2:26 PM   Result Value Ref Range    Hold Specimen JIC    Extra Purple Top Tube    Collection Time: 01/17/24  2:26 PM   Result Value Ref Range    Hold Specimen JIC    Extra Heparinized Syringe    Collection Time: 01/17/24  2:26 PM   Result Value Ref Range    Hold Specimen     Basic metabolic panel    Collection Time: 01/17/24  2:26 PM   Result Value Ref Range    Sodium 140 135 - 145 mmol/L    Potassium 4.3 3.4 - 5.3 mmol/L    Chloride 106 98 - 107 mmol/L    Carbon Dioxide (CO2) 25 22 - 29 mmol/L    Anion Gap 9 7 - 15 mmol/L    Urea Nitrogen 32.5 (H) 8.0 - 23.0 mg/dL    Creatinine 1.14 (H) 0.51 - 0.95 mg/dL    GFR Estimate 49 (L) >60 mL/min/1.73m2    Calcium 9.0 8.8 - 10.2 mg/dL    Glucose 95 70 - 99 mg/dL   Troponin T, High Sensitivity    Collection Time: 01/17/24  2:26 PM   Result Value Ref Range    Troponin T, High Sensitivity 18 (H) <=14 ng/L   Magnesium    Collection Time: 01/17/24  2:26 PM   Result Value Ref Range    Magnesium 2.2 1.7 - 2.3 mg/dL   Nt probnp inpatient (BNP)    Collection Time: 01/17/24  2:26 PM   Result Value Ref Range    N terminal Pro BNP Inpatient 1,016 0 - 1,800 pg/mL   Hepatic function panel    Collection Time: 01/17/24  2:26 PM   Result Value Ref Range    Protein Total 5.8 (L) 6.4 - 8.3 g/dL    Albumin 3.0 (L) 3.5 - 5.2 g/dL    Bilirubin Total 0.9 <=1.2 mg/dL    Alkaline Phosphatase 56 40 - 150 U/L    AST 30 0 - 45 U/L    ALT 7 0 - 50 U/L    Bilirubin Direct 0.36 (H) 0.00 - 0.30 mg/dL   Lipase    Collection Time: 01/17/24  2:26 PM   Result Value Ref Range    Lipase 57 13 - 60 U/L   CBC with platelets and differential    Collection  Time: 01/17/24  2:26 PM   Result Value Ref Range    WBC Count 10.9 4.0 - 11.0 10e3/uL    RBC Count 4.66 3.80 - 5.20 10e6/uL    Hemoglobin 13.6 11.7 - 15.7 g/dL    Hematocrit 40.9 35.0 - 47.0 %    MCV 88 78 - 100 fL    MCH 29.2 26.5 - 33.0 pg    MCHC 33.3 31.5 - 36.5 g/dL    RDW 16.0 (H) 10.0 - 15.0 %    Platelet Count 254 150 - 450 10e3/uL    % Neutrophils 79 %    % Lymphocytes 13 %    % Monocytes 7 %    % Eosinophils 0 %    % Basophils 0 %    % Immature Granulocytes 1 %    NRBCs per 100 WBC 0 <1 /100    Absolute Neutrophils 8.6 (H) 1.6 - 8.3 10e3/uL    Absolute Lymphocytes 1.4 0.8 - 5.3 10e3/uL    Absolute Monocytes 0.7 0.0 - 1.3 10e3/uL    Absolute Eosinophils 0.0 0.0 - 0.7 10e3/uL    Absolute Basophils 0.0 0.0 - 0.2 10e3/uL    Absolute Immature Granulocytes 0.1 <=0.4 10e3/uL    Absolute NRBCs 0.0 10e3/uL   ECG 12-LEAD WITH MUSE (LHE)    Collection Time: 01/17/24  3:21 PM   Result Value Ref Range    Systolic Blood Pressure 125 mmHg    Diastolic Blood Pressure 58 mmHg    Ventricular Rate 59 BPM    Atrial Rate 59 BPM    IN Interval 138 ms    QRS Duration 86 ms     ms    QTc 427 ms    P Axis 69 degrees    R AXIS 55 degrees    T Axis 52 degrees    Interpretation ECG       Sinus bradycardia  Otherwise normal ECG  No previous ECGs available  Confirmed by SEE ED PROVIDER NOTE FOR, ECG INTERPRETATION ([id number]),  Hermilo Youngblood ([id number]) on 1/17/2024 3:25:45 PM     UA with Microscopic reflex to Culture    Collection Time: 01/17/24  3:40 PM    Specimen: Urine, NOS   Result Value Ref Range    Color Urine Yellow Colorless, Straw, Light Yellow, Yellow    Appearance Urine Turbid (A) Clear    Glucose Urine Negative Negative mg/dL    Bilirubin Urine Negative Negative    Ketones Urine Negative Negative mg/dL    Specific Gravity Urine 1.027 1.001 - 1.030    Blood Urine Negative Negative    pH Urine 5.0 5.0 - 7.0    Protein Albumin Urine 20 (A) Negative mg/dL    Urobilinogen Urine 2.0 (A) <2.0 mg/dL    Nitrite Urine  Negative Negative    Leukocyte Esterase Urine 250 Destiny/uL (A) Negative    Mucus Urine Present (A) None Seen /LPF    Calcium Oxalate Crystals Urine Few (A) None Seen /HPF    RBC Urine 3 (H) <=2 /HPF    WBC Urine 54 (H) <=5 /HPF    Squamous Epithelials Urine 3 (H) <=1 /HPF    Hyaline Casts Urine 15 (H) <=2 /LPF   Urine Culture    Collection Time: 01/17/24  3:40 PM    Specimen: Urine, NOS   Result Value Ref Range    Culture 10,000-50,000 CFU/mL Proteus mirabilis (A)     Culture <10,000 CFU/mL Proteus mirabilis (A)        Susceptibility    Proteus mirabilis - THIAGO     Ampicillin <=2 Susceptible ug/mL     Ampicillin/ Sulbactam <=2 Susceptible ug/mL     Piperacillin/Tazobactam <=4 Susceptible ug/mL     Cefazolin* <=4 Susceptible ug/mL      * Cefazolin THIAGO breakpoints are for the treatment of uncomplicated urinary tract infections. For the treatment of systemic infections, please contact the laboratory for additional testing.     Cefoxitin 8 Susceptible ug/mL     Ceftazidime <=1 Susceptible ug/mL     Ceftriaxone <=1 Susceptible ug/mL     Cefepime <=1 Susceptible ug/mL     Gentamicin 2 Susceptible ug/mL     Tobramycin 2 Susceptible ug/mL     Ciprofloxacin <=0.25 Susceptible ug/mL     Levofloxacin <=0.12 Susceptible ug/mL     Nitrofurantoin* 128 Resistant ug/mL      * Intrinsically Resistant     Trimethoprim/Sulfamethoxazole <=1/19 Susceptible ug/mL    Proteus mirabilis - THIAGO     Ampicillin <=2 Susceptible ug/mL     Ampicillin/ Sulbactam <=2 Susceptible ug/mL     Piperacillin/Tazobactam <=4 Susceptible ug/mL     Cefazolin* <=4 Susceptible ug/mL      * Cefazolin THIAGO breakpoints are for the treatment of uncomplicated urinary tract infections. For the treatment of systemic infections, please contact the laboratory for additional testing.     Cefoxitin 8 Susceptible ug/mL     Ceftazidime <=1 Susceptible ug/mL     Ceftriaxone <=1 Susceptible ug/mL     Cefepime <=1 Susceptible ug/mL     Gentamicin 2 Susceptible ug/mL     Tobramycin  <=1 Susceptible ug/mL     Ciprofloxacin <=0.25 Susceptible ug/mL     Levofloxacin <=0.12 Susceptible ug/mL     Nitrofurantoin* 128 Resistant ug/mL      * Intrinsically Resistant     Trimethoprim/Sulfamethoxazole <=1/19 Susceptible ug/mL   Troponin T, High Sensitivity (now)    Collection Time: 01/17/24  3:52 PM   Result Value Ref Range    Troponin T, High Sensitivity 19 (H) <=14 ng/L   CBC with platelets    Collection Time: 01/17/24  6:43 PM   Result Value Ref Range    WBC Count 10.0 4.0 - 11.0 10e3/uL    RBC Count 3.91 3.80 - 5.20 10e6/uL    Hemoglobin 11.3 (L) 11.7 - 15.7 g/dL    Hematocrit 34.3 (L) 35.0 - 47.0 %    MCV 88 78 - 100 fL    MCH 28.9 26.5 - 33.0 pg    MCHC 32.9 31.5 - 36.5 g/dL    RDW 15.9 (H) 10.0 - 15.0 %    Platelet Count 193 150 - 450 10e3/uL   Heparin Unfractionated Anti Xa Level    Collection Time: 01/18/24  1:15 AM   Result Value Ref Range    Anti Xa Unfractionated Heparin 0.90 For Reference Range, See Comment IU/mL   Basic metabolic panel    Collection Time: 01/18/24  6:11 AM   Result Value Ref Range    Sodium 135 135 - 145 mmol/L    Potassium 4.5 3.4 - 5.3 mmol/L    Chloride 104 98 - 107 mmol/L    Carbon Dioxide (CO2) 25 22 - 29 mmol/L    Anion Gap 6 (L) 7 - 15 mmol/L    Urea Nitrogen 30.3 (H) 8.0 - 23.0 mg/dL    Creatinine 1.25 (H) 0.51 - 0.95 mg/dL    GFR Estimate 44 (L) >60 mL/min/1.73m2    Calcium 8.4 (L) 8.8 - 10.2 mg/dL    Glucose 87 70 - 99 mg/dL   CBC with platelets    Collection Time: 01/18/24  6:11 AM   Result Value Ref Range    WBC Count 8.3 4.0 - 11.0 10e3/uL    RBC Count 3.69 (L) 3.80 - 5.20 10e6/uL    Hemoglobin 10.8 (L) 11.7 - 15.7 g/dL    Hematocrit 32.5 (L) 35.0 - 47.0 %    MCV 88 78 - 100 fL    MCH 29.3 26.5 - 33.0 pg    MCHC 33.2 31.5 - 36.5 g/dL    RDW 16.0 (H) 10.0 - 15.0 %    Platelet Count 178 150 - 450 10e3/uL   Heparin Unfractionated Anti Xa Level    Collection Time: 01/18/24  8:48 AM   Result Value Ref Range    Anti Xa Unfractionated Heparin 0.98 For Reference Range,  See Comment IU/mL   Heparin Unfractionated Anti Xa Level    Collection Time: 01/18/24  4:08 PM   Result Value Ref Range    Anti Xa Unfractionated Heparin >1.10 (HH) For Reference Range, See Comment IU/mL   Heparin Unfractionated Anti Xa Level    Collection Time: 01/18/24  7:04 PM   Result Value Ref Range    Anti Xa Unfractionated Heparin >1.10 (HH) For Reference Range, See Comment IU/mL   Basic metabolic panel    Collection Time: 01/20/24 11:39 AM   Result Value Ref Range    Sodium 137 135 - 145 mmol/L    Potassium 4.4 3.4 - 5.3 mmol/L    Chloride 106 98 - 107 mmol/L    Carbon Dioxide (CO2) 25 22 - 29 mmol/L    Anion Gap 6 (L) 7 - 15 mmol/L    Urea Nitrogen 23.9 (H) 8.0 - 23.0 mg/dL    Creatinine 0.87 0.51 - 0.95 mg/dL    GFR Estimate 68 >60 mL/min/1.73m2    Calcium 8.2 (L) 8.8 - 10.2 mg/dL    Glucose 140 (H) 70 - 99 mg/dL   Magnesium    Collection Time: 01/20/24 11:39 AM   Result Value Ref Range    Magnesium 2.2 1.7 - 2.3 mg/dL   Extra Purple Top Tube    Collection Time: 01/20/24 11:39 AM   Result Value Ref Range    Hold Specimen JIC    TSH with free T4 reflex    Collection Time: 01/20/24 11:39 AM   Result Value Ref Range    TSH 2.74 0.30 - 4.20 uIU/mL   ECG 12-LEAD WITH MUSE (LHE)    Collection Time: 01/20/24 11:45 AM   Result Value Ref Range    Systolic Blood Pressure  mmHg    Diastolic Blood Pressure  mmHg    Ventricular Rate 52 BPM    Atrial Rate 52 BPM    ND Interval 128 ms    QRS Duration 94 ms     ms    QTc 396 ms    P Axis 60 degrees    R AXIS 41 degrees    T Axis 55 degrees    Interpretation ECG       Sinus bradycardia  Otherwise normal ECG  When compared with ECG of 17-JAN-2024 15:21,  No significant change was found  Confirmed by BIRDIE ZEE MD LOC:[facility] ([id number]) on 1/22/2024 3:19:01 PM           Patient seen independently and with the resident.  Wounds were examined.  She is doing well and ambulating independently in her room without any assist device.  Currently on anticoagulation for PE.  She  will need follow-up with urology to discuss a treatment plan for her kidney mass  Agree with above treatment plan  Also noted to have a UTI but cultures grew only 10-50,000 of Proteus she is clinically asymptomatic so will not reinitiate antibiotics  Prednisone was also held at the time of discharge      Electronically signed by    Anju Guillen MD

## 2024-01-30 ENCOUNTER — DISCHARGE SUMMARY NURSING HOME (OUTPATIENT)
Dept: GERIATRICS | Facility: CLINIC | Age: 79
End: 2024-01-30
Payer: COMMERCIAL

## 2024-01-30 VITALS
TEMPERATURE: 97.5 F | HEART RATE: 62 BPM | BODY MASS INDEX: 23.92 KG/M2 | RESPIRATION RATE: 16 BRPM | WEIGHT: 135 LBS | SYSTOLIC BLOOD PRESSURE: 108 MMHG | DIASTOLIC BLOOD PRESSURE: 68 MMHG | HEIGHT: 63 IN | OXYGEN SATURATION: 94 %

## 2024-01-30 DIAGNOSIS — C64.9 MALIGNANT NEOPLASM OF KIDNEY, UNSPECIFIED LATERALITY (H): ICD-10-CM

## 2024-01-30 DIAGNOSIS — I12.9 HYPERTENSIVE CHRONIC KIDNEY DISEASE WITH STAGE 1 THROUGH STAGE 4 CHRONIC KIDNEY DISEASE, OR UNSPECIFIED CHRONIC KIDNEY DISEASE: ICD-10-CM

## 2024-01-30 DIAGNOSIS — N18.31 CHRONIC KIDNEY DISEASE, STAGE 3A (H): ICD-10-CM

## 2024-01-30 DIAGNOSIS — I26.99 BILATERAL PULMONARY EMBOLISM (H): Primary | ICD-10-CM

## 2024-01-30 DIAGNOSIS — H90.3 SENSORINEURAL HEARING LOSS (SNHL) OF BOTH EARS: ICD-10-CM

## 2024-01-30 PROCEDURE — 99315 NF DSCHRG MGMT 30 MIN/LESS: CPT | Performed by: FAMILY MEDICINE

## 2024-01-30 NOTE — PROGRESS NOTES
[facility] GERIATRIC SERVICES       Patient Chelsea Peacock  MRN: [id number]        Reason for Visit     Chief Complaint   Patient presents with    Discharge Summary Nursing Home       Code Status     DNR / DNI    Assessment       Generalized weakness  Kidney Mass  Bilateral PE  Pressure injury stage III  History of weight loss of greater than 100 pounds per patient report  Asymptomatic bradycardia  Acute cystitis with cultures growing Proteus  Hypertension  PMR/steroid-dependent  Osteoporosis  Hyperlipidemia    Plan     Pt is admitted to TCU for strengthening and rehab.    Recheck labs  Continue with PT/OT    Bilateral PE   - Continue Rivaraxaban 15 mg BID x 21 days, then 20 mg daily  Doing well  No hypoxia  2. Left Renal Mass, Pulmonary Nodule   - Follow up with Urology in 2-3 weeks  Patient reports to has her daughter will be making appointment  3. HTN  Asymptomatic Bradycardia   - Metoprolol and Lisinopril were held on hospital discharge   - Monitor BP/Heart rate and blood pressures continue to run on the low side  She is only on a low-dose of furosemide  Advised not to resume medications  4. Sacral Wound   - Continue Wound care; improved  5. Generalized Weakness   - Concern for component of adrenal insufficiency with chronic steroid use  Noted to be now ambulating independently without any assist device.  No longer on steroids  Walking 600 feet plus.  Does not need any assist device.  Cognitive impairment is mild with a slums of 22/30   - PT/OT  6. PMR-not on any prednisone any longer  Discontinue oxycodone  7 polypharmacy advised discussion with her primary care physician to look at her multiple supplements      History     Patient is a very pleasant 78 year old female who is admitted to TCU.  Patient presented to [facility] ED with weakness, and failure to thrive. She was diagnosed with bilateral PE and new left renal mass. She was discharged on Xarelto for treatment. She was seen by Urology in the hospital and  recommended to follow up with Dr Brewer from [facility] in 2-3 weeks.   She has been doing well ambulating 600 feet.  Denies any pain.  She will follow-up with urology as scheduled and reported that her daughter will be making those appointments for her      Past Medical & Surgical History     Hld  osteopenia    Past Social History     Reviewed,  reports that she has quit smoking. Her smoking use included cigarettes. She has never used smokeless tobacco.    Family History     Reviewed, and family history is not on file.    Medication List     Current Outpatient Medications   Medication    brimonidine (ALPHAGAN) 0.2 % ophthalmic solution    calcium carbonate-vitamin D (OSCAL) 500-5 MG-MCG tablet    cholecalciferol, vitamin D3, 1,000 unit tablet    cyanocobalamin 1000 MCG tablet    furosemide (LASIX) 20 MG tablet    glucosamine sulfate (GLUCOSAMINE) 500 mg Tab    Omega-3 Fatty Acids (FISH OIL) 1200 MG capsule    oxyCODONE (ROXICODONE) 5 MG tablet    potassium chloride SA (K-DUR,KLOR-CON) 10 MEQ tablet    pravastatin (PRAVACHOL) 80 MG tablet    RISEdronate (ACTONEL) 35 MG tablet    rivaroxaban ANTICOAGULANT (XARELTO) 15 MG TABS tablet    rivaroxaban ANTICOAGULANT (XARELTO) 20 MG TABS tablet    therapeutic multivitamin (THERAGRAN) tablet     No current facility-administered medications for this visit.      MED REC REQUIRED  Post Medication Reconciliation Status:          Allergies     Allergies   Allergen Reactions    Cortisone Itching    Sulfa (Sulfonamide Antibiotics) [Sulfa Antibiotics] Itching    Sulfamethoxazole-Trimethoprim Unknown       Review of Systems   A comprehensive review of 14 systems was done. Pertinent findings noted here and in history of present illness. All the rest negative.  Constitutional: Negative.  Negative for fever, chills, she has  activity change, appetite change and fatigue.   Reporting weight loss of greater than 100 pounds  HENT: Negative for congestion and facial swelling.    Eyes:  "Negative for photophobia, redness and visual disturbance.   Respiratory: Negative for cough and chest tightness.    Cardiovascular: Negative for chest pain, palpitations and leg swelling.   Gastrointestinal: Negative for nausea, diarrhea, constipation, blood in stool and abdominal distention.   Genitourinary: Negative.    Musculoskeletal: Negative.  She is ambulating without any assist device  Skin: Negative.    Neurological: Negative for dizziness, tremors, syncope, weakness, light-headedness and headaches.   Hematological: Does not bruise/bleed easily.   Psychiatric/Behavioral: Negative.        Physical Exam   /68   Pulse 62   Temp 97.5  F (36.4  C)   Resp 16   Ht 1.6 m (5' 3\")   Wt 61.2 kg (135 lb)   SpO2 94%   BMI 23.91 kg/m       Constitutional: Oriented to person, place, and appears well-developed.   HEENT:  Normocephalic and atraumatic.  Eyes: Conjunctivae and EOM are normal. Pupils are equal, round, and reactive to light. No discharge.  No scleral icterus. Nose normal. Mouth/Throat: Oropharynx is clear and moist. No oropharyngeal exudate.    Noted to have hearing loss  NECK: Normal range of motion. Neck supple. No JVD present. No tracheal deviation present. No thyromegaly present.   CARDIOVASCULAR: Normal rate, regular rhythm and intact distal pulses.  Exam reveals no gallop and no friction rub.    PULMONARY: Effort normal and breath sounds normal. No respiratory distress.No Wheezing or rales.  ABDOMEN: Soft. Bowel sounds are normal. No distension and no mass.  There is no tenderness. There is no rebound and no guarding. No HSM.  MUSCULOSKELETAL: Normal range of motion. Mild kyphosis, no tenderness.  LYMPH NODES: Has no cervical, supraclavicular, axillary and groin adenopathy.   NEUROLOGICAL: Alert and oriented to person, place, and time. No cranial nerve deficit.  Normal muscle tone. Coordination normal.   GENITOURINARY: Deferred exam.  SKIN: Skin is warm and dry. No rash noted. No erythema. No " pallor.   EXTREMITIES: No cyanosis, no clubbing, no edema. No Deformity.  PSYCHIATRIC: Normal mood, affect and behavior.      MEDICAL EQUIPMENT NEEDS:  na     DISCHARGE PLAN/FACE TO FACE:  I certify that services are/were furnished while this patient was under the care of a physician and that a physician or an allowed non-physician practitioner (NPP), had a face-to-face encounter that meets the physician face-to-face encounter requirements. The encounter was in whole, or in part, related to the primary reason for home health. The patient is confined to his/her home and needs intermittent skilled nursing, physical therapy, speech-language pathology, or the continued need for occupational therapy. A plan of care has been established by a physician and is periodically reviewed by a physician.  Date of Face-to-Face Encounter: 1/30/24     I certify that, based on my findings, the following services are medically necessary home health services: [facility] HHA/RN and PT/OT to evaluate and treat    My clinical findings support the need for the above skilled services because: patient will be discharging to home. Patient will need assistance with medication management and performing IADLs and ADLs effectively and safely.     Patient to re-establish plan of care with their PCP within 7 days after leaving TCU.    Follow-up with urology as scheduled    Electronically signed by    Anju Guillen MD

## 2024-01-30 NOTE — LETTER
1/30/2024        RE: Chelsea CHAIREZ Alt  [address]  [place] [address]        [facility] GERIATRIC SERVICES       Patient Chelsea Peacock  MRN: [id number]        Reason for Visit     Chief Complaint   Patient presents with     Discharge Summary Nursing Home       Code Status     DNR / DNI    Assessment       Generalized weakness  Kidney Mass  Bilateral PE  Pressure injury stage III  History of weight loss of greater than 100 pounds per patient report  Asymptomatic bradycardia  Acute cystitis with cultures growing Proteus  Hypertension  PMR/steroid-dependent  Osteoporosis  Hyperlipidemia    Plan     Pt is admitted to TCU for strengthening and rehab.    Recheck labs  Continue with PT/OT    Bilateral PE   - Continue Rivaraxaban 15 mg BID x 21 days, then 20 mg daily  Doing well  No hypoxia  2. Left Renal Mass, Pulmonary Nodule   - Follow up with Urology in 2-3 weeks  Patient reports to has her daughter will be making appointment  3. HTN  Asymptomatic Bradycardia   - Metoprolol and Lisinopril were held on hospital discharge   - Monitor BP/Heart rate and blood pressures continue to run on the low side  She is only on a low-dose of furosemide  Advised not to resume medications  4. Sacral Wound   - Continue Wound care; improved  5. Generalized Weakness   - Concern for component of adrenal insufficiency with chronic steroid use  Noted to be now ambulating independently without any assist device.  No longer on steroids  Walking 600 feet plus.  Does not need any assist device.  Cognitive impairment is mild with a slums of 22/30   - PT/OT  6. PMR-not on any prednisone any longer  Discontinue oxycodone  7 polypharmacy advised discussion with her primary care physician to look at her multiple supplements      History     Patient is a very pleasant 78 year old female who is admitted to TCU.  Patient presented to [facility] ED with weakness, and failure to thrive. She was diagnosed with bilateral PE and new left renal mass. She was  discharged on Xarelto for treatment. She was seen by Urology in the hospital and recommended to follow up with Dr Brewer from [facility] in 2-3 weeks.   She has been doing well ambulating 600 feet.  Denies any pain.  She will follow-up with urology as scheduled and reported that her daughter will be making those appointments for her      Past Medical & Surgical History     Hld  osteopenia    Past Social History     Reviewed,  reports that she has quit smoking. Her smoking use included cigarettes. She has never used smokeless tobacco.    Family History     Reviewed, and family history is not on file.    Medication List     Current Outpatient Medications   Medication     brimonidine (ALPHAGAN) 0.2 % ophthalmic solution     calcium carbonate-vitamin D (OSCAL) 500-5 MG-MCG tablet     cholecalciferol, vitamin D3, 1,000 unit tablet     cyanocobalamin 1000 MCG tablet     furosemide (LASIX) 20 MG tablet     glucosamine sulfate (GLUCOSAMINE) 500 mg Tab     Omega-3 Fatty Acids (FISH OIL) 1200 MG capsule     oxyCODONE (ROXICODONE) 5 MG tablet     potassium chloride SA (K-DUR,KLOR-CON) 10 MEQ tablet     pravastatin (PRAVACHOL) 80 MG tablet     RISEdronate (ACTONEL) 35 MG tablet     rivaroxaban ANTICOAGULANT (XARELTO) 15 MG TABS tablet     rivaroxaban ANTICOAGULANT (XARELTO) 20 MG TABS tablet     therapeutic multivitamin (THERAGRAN) tablet     No current facility-administered medications for this visit.      MED REC REQUIRED  Post Medication Reconciliation Status:          Allergies     Allergies   Allergen Reactions     Cortisone Itching     Sulfa (Sulfonamide Antibiotics) [Sulfa Antibiotics] Itching     Sulfamethoxazole-Trimethoprim Unknown       Review of Systems   A comprehensive review of 14 systems was done. Pertinent findings noted here and in history of present illness. All the rest negative.  Constitutional: Negative.  Negative for fever, chills, she has  activity change, appetite change and fatigue.   Reporting  "weight loss of greater than 100 pounds  HENT: Negative for congestion and facial swelling.    Eyes: Negative for photophobia, redness and visual disturbance.   Respiratory: Negative for cough and chest tightness.    Cardiovascular: Negative for chest pain, palpitations and leg swelling.   Gastrointestinal: Negative for nausea, diarrhea, constipation, blood in stool and abdominal distention.   Genitourinary: Negative.    Musculoskeletal: Negative.  She is ambulating without any assist device  Skin: Negative.    Neurological: Negative for dizziness, tremors, syncope, weakness, light-headedness and headaches.   Hematological: Does not bruise/bleed easily.   Psychiatric/Behavioral: Negative.        Physical Exam   /68   Pulse 62   Temp 97.5  F (36.4  C)   Resp 16   Ht 1.6 m (5' 3\")   Wt 61.2 kg (135 lb)   SpO2 94%   BMI 23.91 kg/m       Constitutional: Oriented to person, place, and appears well-developed.   HEENT:  Normocephalic and atraumatic.  Eyes: Conjunctivae and EOM are normal. Pupils are equal, round, and reactive to light. No discharge.  No scleral icterus. Nose normal. Mouth/Throat: Oropharynx is clear and moist. No oropharyngeal exudate.    Noted to have hearing loss  NECK: Normal range of motion. Neck supple. No JVD present. No tracheal deviation present. No thyromegaly present.   CARDIOVASCULAR: Normal rate, regular rhythm and intact distal pulses.  Exam reveals no gallop and no friction rub.    PULMONARY: Effort normal and breath sounds normal. No respiratory distress.No Wheezing or rales.  ABDOMEN: Soft. Bowel sounds are normal. No distension and no mass.  There is no tenderness. There is no rebound and no guarding. No HSM.  MUSCULOSKELETAL: Normal range of motion. Mild kyphosis, no tenderness.  LYMPH NODES: Has no cervical, supraclavicular, axillary and groin adenopathy.   NEUROLOGICAL: Alert and oriented to person, place, and time. No cranial nerve deficit.  Normal muscle tone. Coordination " normal.   GENITOURINARY: Deferred exam.  SKIN: Skin is warm and dry. No rash noted. No erythema. No pallor.   EXTREMITIES: No cyanosis, no clubbing, no edema. No Deformity.  PSYCHIATRIC: Normal mood, affect and behavior.      MEDICAL EQUIPMENT NEEDS:  na     DISCHARGE PLAN/FACE TO FACE:  I certify that services are/were furnished while this patient was under the care of a physician and that a physician or an allowed non-physician practitioner (NPP), had a face-to-face encounter that meets the physician face-to-face encounter requirements. The encounter was in whole, or in part, related to the primary reason for home health. The patient is confined to his/her home and needs intermittent skilled nursing, physical therapy, speech-language pathology, or the continued need for occupational therapy. A plan of care has been established by a physician and is periodically reviewed by a physician.  Date of Face-to-Face Encounter: 1/30/24     I certify that, based on my findings, the following services are medically necessary home health services: [facility] HHA/RN and PT/OT to evaluate and treat    My clinical findings support the need for the above skilled services because: patient will be discharging to home. Patient will need assistance with medication management and performing IADLs and ADLs effectively and safely.     Patient to re-establish plan of care with their PCP within 7 days after leaving TCU.    Follow-up with urology as scheduled    Electronically signed by    Anju Guillen MD                            Sincerely,        JORDAN Esteban

## 2024-02-08 ENCOUNTER — TELEPHONE (OUTPATIENT)
Dept: RHEUMATOLOGY | Facility: CLINIC | Age: 79
End: 2024-02-08
Payer: COMMERCIAL

## 2024-02-08 NOTE — TELEPHONE ENCOUNTER
General Call      Reason for Call:  Called pt to inform her of an available appt via telephone w/ Dr. Rodriguez. Today, 2/8/24. LVM with information to call back.    NATACHA Nichols

## 2024-02-16 ENCOUNTER — TELEPHONE (OUTPATIENT)
Dept: RHEUMATOLOGY | Facility: CLINIC | Age: 79
End: 2024-02-16
Payer: COMMERCIAL

## 2024-02-16 NOTE — TELEPHONE ENCOUNTER
I called the patient however I was not able to reach her. I called her alternate contact listed on file [son, John].  He shared that Chelsea was hospitalized recently with pulmonary embolism and found to have renal mass currently being evaluated for renal cell carcinoma.  She remains to be on prednisone and she is having issues with water retention.  She has been evaluated by her primary care provider and she is scheduled to see nephrology in few days.  I recommended rheumatology follow-up for PMR to assess her disease and decide on the prednisone dose.  He verbalized understanding and he will call the office to schedule the appointment.    Javid Rodriguez MD

## 2024-03-04 ENCOUNTER — LAB REQUISITION (OUTPATIENT)
Dept: LAB | Facility: CLINIC | Age: 79
End: 2024-03-04

## 2024-03-04 DIAGNOSIS — I95.9 HYPOTENSION, UNSPECIFIED: ICD-10-CM

## 2024-03-04 PROCEDURE — 84443 ASSAY THYROID STIM HORMONE: CPT | Performed by: STUDENT IN AN ORGANIZED HEALTH CARE EDUCATION/TRAINING PROGRAM

## 2024-03-04 PROCEDURE — 80053 COMPREHEN METABOLIC PANEL: CPT | Performed by: STUDENT IN AN ORGANIZED HEALTH CARE EDUCATION/TRAINING PROGRAM

## 2024-03-04 PROCEDURE — 84439 ASSAY OF FREE THYROXINE: CPT | Performed by: STUDENT IN AN ORGANIZED HEALTH CARE EDUCATION/TRAINING PROGRAM

## 2024-03-05 LAB
ALBUMIN SERPL BCG-MCNC: 2.8 G/DL (ref 3.5–5.2)
ALP SERPL-CCNC: 69 U/L (ref 40–150)
ALT SERPL W P-5'-P-CCNC: 6 U/L (ref 0–50)
ANION GAP SERPL CALCULATED.3IONS-SCNC: 8 MMOL/L (ref 7–15)
AST SERPL W P-5'-P-CCNC: 28 U/L (ref 0–45)
BILIRUB SERPL-MCNC: 0.5 MG/DL
BUN SERPL-MCNC: 20.8 MG/DL (ref 8–23)
CALCIUM SERPL-MCNC: 7.9 MG/DL (ref 8.8–10.2)
CHLORIDE SERPL-SCNC: 107 MMOL/L (ref 98–107)
CREAT SERPL-MCNC: 1.03 MG/DL (ref 0.51–0.95)
DEPRECATED HCO3 PLAS-SCNC: 24 MMOL/L (ref 22–29)
EGFRCR SERPLBLD CKD-EPI 2021: 56 ML/MIN/1.73M2
GLUCOSE SERPL-MCNC: 96 MG/DL (ref 70–99)
POTASSIUM SERPL-SCNC: 4.5 MMOL/L (ref 3.4–5.3)
PROT SERPL-MCNC: 5.3 G/DL (ref 6.4–8.3)
SODIUM SERPL-SCNC: 139 MMOL/L (ref 135–145)
T4 FREE SERPL-MCNC: 1 NG/DL (ref 0.9–1.7)
TSH SERPL DL<=0.005 MIU/L-ACNC: 4.45 UIU/ML (ref 0.3–4.2)

## 2024-03-13 ENCOUNTER — TELEPHONE (OUTPATIENT)
Dept: RHEUMATOLOGY | Facility: CLINIC | Age: 79
End: 2024-03-13
Payer: COMMERCIAL

## 2024-03-13 NOTE — TELEPHONE ENCOUNTER
[facility] appointment canceled, we made one more attempt to reschedule.     Routing back to referring provider and [facility] pharmacist.       Carlos Alexandre, [facility]

## 2024-03-25 ENCOUNTER — HOSPITAL ENCOUNTER (INPATIENT)
Facility: HOSPITAL | Age: 79
Setting detail: SURGERY ADMIT
End: 2024-03-25
Attending: UROLOGY | Admitting: UROLOGY
Payer: COMMERCIAL

## 2024-04-01 RX ORDER — CEFAZOLIN SODIUM/WATER 2 G/20 ML
2 SYRINGE (ML) INTRAVENOUS SEE ADMIN INSTRUCTIONS
Status: CANCELLED | OUTPATIENT
Start: 2024-04-01

## 2024-04-01 RX ORDER — CEFAZOLIN SODIUM/WATER 2 G/20 ML
2 SYRINGE (ML) INTRAVENOUS
Status: CANCELLED | OUTPATIENT
Start: 2024-04-01

## 2024-04-03 ENCOUNTER — LAB REQUISITION (OUTPATIENT)
Dept: LAB | Facility: CLINIC | Age: 79
End: 2024-04-03

## 2024-04-03 DIAGNOSIS — R63.4 ABNORMAL WEIGHT LOSS: ICD-10-CM

## 2024-04-03 LAB
BASOPHILS # BLD AUTO: 0.1 10E3/UL (ref 0–0.2)
BASOPHILS NFR BLD AUTO: 1 %
EOSINOPHIL # BLD AUTO: 0.1 10E3/UL (ref 0–0.7)
EOSINOPHIL NFR BLD AUTO: 1 %
ERYTHROCYTE [DISTWIDTH] IN BLOOD BY AUTOMATED COUNT: 15.9 % (ref 10–15)
ERYTHROCYTE [SEDIMENTATION RATE] IN BLOOD BY WESTERGREN METHOD: 18 MM/HR (ref 0–30)
HCT VFR BLD AUTO: 39.7 % (ref 35–47)
HGB BLD-MCNC: 13.1 G/DL (ref 11.7–15.7)
IMM GRANULOCYTES # BLD: 0.1 10E3/UL
IMM GRANULOCYTES NFR BLD: 1 %
LYMPHOCYTES # BLD AUTO: 2.3 10E3/UL (ref 0.8–5.3)
LYMPHOCYTES NFR BLD AUTO: 24 %
MCH RBC QN AUTO: 28.8 PG (ref 26.5–33)
MCHC RBC AUTO-ENTMCNC: 33 G/DL (ref 31.5–36.5)
MCV RBC AUTO: 87 FL (ref 78–100)
MONOCYTES # BLD AUTO: 0.8 10E3/UL (ref 0–1.3)
MONOCYTES NFR BLD AUTO: 8 %
NEUTROPHILS # BLD AUTO: 6.3 10E3/UL (ref 1.6–8.3)
NEUTROPHILS NFR BLD AUTO: 65 %
NRBC # BLD AUTO: 0 10E3/UL
NRBC BLD AUTO-RTO: 0 /100
PLATELET # BLD AUTO: 276 10E3/UL (ref 150–450)
RBC # BLD AUTO: 4.55 10E6/UL (ref 3.8–5.2)
WBC # BLD AUTO: 9.6 10E3/UL (ref 4–11)

## 2024-04-03 PROCEDURE — 85652 RBC SED RATE AUTOMATED: CPT | Performed by: STUDENT IN AN ORGANIZED HEALTH CARE EDUCATION/TRAINING PROGRAM

## 2024-04-03 PROCEDURE — 86140 C-REACTIVE PROTEIN: CPT | Performed by: STUDENT IN AN ORGANIZED HEALTH CARE EDUCATION/TRAINING PROGRAM

## 2024-04-03 PROCEDURE — 85025 COMPLETE CBC W/AUTO DIFF WBC: CPT | Performed by: STUDENT IN AN ORGANIZED HEALTH CARE EDUCATION/TRAINING PROGRAM

## 2024-04-03 PROCEDURE — 80053 COMPREHEN METABOLIC PANEL: CPT | Performed by: STUDENT IN AN ORGANIZED HEALTH CARE EDUCATION/TRAINING PROGRAM

## 2024-04-04 LAB
ALBUMIN SERPL BCG-MCNC: 2.8 G/DL (ref 3.5–5.2)
ALP SERPL-CCNC: 86 U/L (ref 40–150)
ALT SERPL W P-5'-P-CCNC: 21 U/L (ref 0–50)
ANION GAP SERPL CALCULATED.3IONS-SCNC: 10 MMOL/L (ref 7–15)
AST SERPL W P-5'-P-CCNC: 42 U/L (ref 0–45)
BILIRUB SERPL-MCNC: 0.3 MG/DL
BUN SERPL-MCNC: 21.7 MG/DL (ref 8–23)
CALCIUM SERPL-MCNC: 8.7 MG/DL (ref 8.8–10.2)
CHLORIDE SERPL-SCNC: 103 MMOL/L (ref 98–107)
CREAT SERPL-MCNC: 1.17 MG/DL (ref 0.51–0.95)
CRP SERPL-MCNC: 4.83 MG/L
DEPRECATED HCO3 PLAS-SCNC: 25 MMOL/L (ref 22–29)
EGFRCR SERPLBLD CKD-EPI 2021: 48 ML/MIN/1.73M2
GLUCOSE SERPL-MCNC: 92 MG/DL (ref 70–99)
POTASSIUM SERPL-SCNC: 4.2 MMOL/L (ref 3.4–5.3)
PROT SERPL-MCNC: 6 G/DL (ref 6.4–8.3)
SODIUM SERPL-SCNC: 138 MMOL/L (ref 135–145)

## 2024-05-15 ENCOUNTER — LAB REQUISITION (OUTPATIENT)
Dept: LAB | Facility: CLINIC | Age: 79
End: 2024-05-15

## 2024-05-15 DIAGNOSIS — D64.9 ANEMIA, UNSPECIFIED: ICD-10-CM

## 2024-05-15 DIAGNOSIS — Z01.818 ENCOUNTER FOR OTHER PREPROCEDURAL EXAMINATION: ICD-10-CM

## 2024-05-15 PROCEDURE — 80048 BASIC METABOLIC PNL TOTAL CA: CPT | Performed by: NURSE PRACTITIONER

## 2024-05-15 PROCEDURE — 83550 IRON BINDING TEST: CPT | Performed by: NURSE PRACTITIONER

## 2024-05-16 LAB
ANION GAP SERPL CALCULATED.3IONS-SCNC: 9 MMOL/L (ref 7–15)
BUN SERPL-MCNC: 12.9 MG/DL (ref 8–23)
CALCIUM SERPL-MCNC: 8.2 MG/DL (ref 8.8–10.2)
CHLORIDE SERPL-SCNC: 108 MMOL/L (ref 98–107)
CREAT SERPL-MCNC: 0.82 MG/DL (ref 0.51–0.95)
DEPRECATED HCO3 PLAS-SCNC: 24 MMOL/L (ref 22–29)
EGFRCR SERPLBLD CKD-EPI 2021: 73 ML/MIN/1.73M2
GLUCOSE SERPL-MCNC: 86 MG/DL (ref 70–99)
IRON BINDING CAPACITY (ROCHE): 119 UG/DL (ref 240–430)
IRON SATN MFR SERPL: 42 % (ref 15–46)
IRON SERPL-MCNC: 50 UG/DL (ref 37–145)
POTASSIUM SERPL-SCNC: 4.1 MMOL/L (ref 3.4–5.3)
SODIUM SERPL-SCNC: 141 MMOL/L (ref 135–145)

## 2024-06-05 ENCOUNTER — TELEPHONE (OUTPATIENT)
Dept: PHARMACY | Facility: OTHER | Age: 79
End: 2024-06-05
Payer: COMMERCIAL

## 2024-06-05 NOTE — TELEPHONE ENCOUNTER
[facility] Recruitment: [facility]     Referral outreach attempt #1 on June 5, 2024      Outcome: left voicemail- Call back number [phone]    Ellyn Puckett CPhT  [facility]

## 2024-07-18 ENCOUNTER — HOSPITAL ENCOUNTER (INPATIENT)
Facility: HOSPITAL | Age: 79
Setting detail: SURGERY ADMIT
End: 2024-07-18
Attending: UROLOGY | Admitting: UROLOGY
Payer: COMMERCIAL

## 2024-07-20 ENCOUNTER — HEALTH MAINTENANCE LETTER (OUTPATIENT)
Age: 79
End: 2024-07-20

## 2024-07-24 RX ORDER — CEFAZOLIN SODIUM/WATER 2 G/20 ML
2 SYRINGE (ML) INTRAVENOUS SEE ADMIN INSTRUCTIONS
Status: CANCELLED | OUTPATIENT
Start: 2024-07-24

## 2024-07-24 RX ORDER — CEFAZOLIN SODIUM/WATER 2 G/20 ML
2 SYRINGE (ML) INTRAVENOUS
Status: CANCELLED | OUTPATIENT
Start: 2024-07-24

## 2024-08-02 ENCOUNTER — LAB REQUISITION (OUTPATIENT)
Dept: LAB | Facility: CLINIC | Age: 79
End: 2024-08-02
Payer: COMMERCIAL

## 2024-08-02 DIAGNOSIS — Z01.818 ENCOUNTER FOR OTHER PREPROCEDURAL EXAMINATION: ICD-10-CM

## 2024-08-02 LAB
ANION GAP SERPL CALCULATED.3IONS-SCNC: 11 MMOL/L (ref 7–15)
BUN SERPL-MCNC: 24.3 MG/DL (ref 8–23)
CALCIUM SERPL-MCNC: 9.1 MG/DL (ref 8.8–10.4)
CHLORIDE SERPL-SCNC: 108 MMOL/L (ref 98–107)
CREAT SERPL-MCNC: 0.95 MG/DL (ref 0.51–0.95)
EGFRCR SERPLBLD CKD-EPI 2021: 61 ML/MIN/1.73M2
GLUCOSE SERPL-MCNC: 91 MG/DL (ref 70–99)
HCO3 SERPL-SCNC: 25 MMOL/L (ref 22–29)
POTASSIUM SERPL-SCNC: 4.2 MMOL/L (ref 3.4–5.3)
SODIUM SERPL-SCNC: 144 MMOL/L (ref 135–145)

## 2024-08-02 PROCEDURE — 80048 BASIC METABOLIC PNL TOTAL CA: CPT | Performed by: STUDENT IN AN ORGANIZED HEALTH CARE EDUCATION/TRAINING PROGRAM

## 2025-08-09 ENCOUNTER — HEALTH MAINTENANCE LETTER (OUTPATIENT)
Age: 80
End: 2025-08-09